# Patient Record
Sex: FEMALE | Race: WHITE | Employment: FULL TIME | URBAN - METROPOLITAN AREA
[De-identification: names, ages, dates, MRNs, and addresses within clinical notes are randomized per-mention and may not be internally consistent; named-entity substitution may affect disease eponyms.]

---

## 2021-07-12 ENCOUNTER — OFFICE VISIT (OUTPATIENT)
Dept: INTERNAL MEDICINE CLINIC | Age: 66
End: 2021-07-12
Payer: MEDICARE

## 2021-07-12 VITALS
WEIGHT: 117.2 LBS | HEIGHT: 63 IN | BODY MASS INDEX: 20.77 KG/M2 | DIASTOLIC BLOOD PRESSURE: 80 MMHG | RESPIRATION RATE: 14 BRPM | OXYGEN SATURATION: 97 % | TEMPERATURE: 98.2 F | SYSTOLIC BLOOD PRESSURE: 134 MMHG | HEART RATE: 67 BPM

## 2021-07-12 DIAGNOSIS — Z12.11 SCREENING FOR COLON CANCER: ICD-10-CM

## 2021-07-12 DIAGNOSIS — R01.1 SYSTOLIC MURMUR: ICD-10-CM

## 2021-07-12 DIAGNOSIS — Z12.31 ENCOUNTER FOR SCREENING MAMMOGRAM FOR MALIGNANT NEOPLASM OF BREAST: ICD-10-CM

## 2021-07-12 DIAGNOSIS — I83.93 VARICOSE VEINS OF BOTH LOWER EXTREMITIES, UNSPECIFIED WHETHER COMPLICATED: ICD-10-CM

## 2021-07-12 DIAGNOSIS — Z00.00 ROUTINE ADULT HEALTH MAINTENANCE: ICD-10-CM

## 2021-07-12 DIAGNOSIS — Z78.0 POST-MENOPAUSAL: ICD-10-CM

## 2021-07-12 DIAGNOSIS — I73.00 RAYNAUD'S DISEASE WITHOUT GANGRENE: Primary | ICD-10-CM

## 2021-07-12 PROCEDURE — 99214 OFFICE O/P EST MOD 30 MIN: CPT | Performed by: INTERNAL MEDICINE

## 2021-07-12 PROCEDURE — 1090F PRES/ABSN URINE INCON ASSESS: CPT | Performed by: INTERNAL MEDICINE

## 2021-07-12 PROCEDURE — G8400 PT W/DXA NO RESULTS DOC: HCPCS | Performed by: INTERNAL MEDICINE

## 2021-07-12 PROCEDURE — G8420 CALC BMI NORM PARAMETERS: HCPCS | Performed by: INTERNAL MEDICINE

## 2021-07-12 PROCEDURE — G8536 NO DOC ELDER MAL SCRN: HCPCS | Performed by: INTERNAL MEDICINE

## 2021-07-12 PROCEDURE — G8427 DOCREV CUR MEDS BY ELIG CLIN: HCPCS | Performed by: INTERNAL MEDICINE

## 2021-07-12 PROCEDURE — 3017F COLORECTAL CA SCREEN DOC REV: CPT | Performed by: INTERNAL MEDICINE

## 2021-07-12 PROCEDURE — G9899 SCRN MAM PERF RSLTS DOC: HCPCS | Performed by: INTERNAL MEDICINE

## 2021-07-12 PROCEDURE — 1101F PT FALLS ASSESS-DOCD LE1/YR: CPT | Performed by: INTERNAL MEDICINE

## 2021-07-12 PROCEDURE — G8510 SCR DEP NEG, NO PLAN REQD: HCPCS | Performed by: INTERNAL MEDICINE

## 2021-07-12 RX ORDER — VITAMIN E CAP 100 UNIT 100 UNIT
CAP ORAL DAILY
COMMUNITY

## 2021-07-12 RX ORDER — METAPROTERENOL SULFATE 20 MG
TABLET ORAL
COMMUNITY
End: 2022-10-25

## 2021-07-12 RX ORDER — ASCORBIC ACID 250 MG
TABLET ORAL
COMMUNITY

## 2021-07-12 RX ORDER — ACETAMINOPHEN 500 MG
TABLET ORAL DAILY
COMMUNITY
End: 2022-10-25

## 2021-07-12 RX ORDER — BISMUTH SUBSALICYLATE 262 MG
1 TABLET,CHEWABLE ORAL DAILY
COMMUNITY

## 2021-07-12 NOTE — ASSESSMENT & PLAN NOTE
Reports noting her fingers turn blue in the cold. Ongoing since teenage years. Recently, has noticed skin tightening in her face. No difficulty swallowing, joint pain or swelling, dry eyes, dry mouth, photosensitive rash. Evaluate for possible underlying autoimmune disorder given history of what sounds like Raynaud's and skin tightening.   Labs ordered, rheumatology referral provided

## 2021-07-12 NOTE — ASSESSMENT & PLAN NOTE
She is not that interested in cancer screening because she is not currently having symptoms. Advised that the goal of cancer screening is to prevent or catch cancer prior to symptoms. FIT testing mammogram ordered. DEXA scan ordered.

## 2021-07-12 NOTE — ASSESSMENT & PLAN NOTE
2-9/9, systolic. Known previously.  No chest pain, shortness of breath, lightheadedness, dizziness  Cont to monitor
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
## 2021-07-12 NOTE — ASSESSMENT & PLAN NOTE
Status post vein stripping in the past.  Interested in discussing possible interventions.   Denies any symptoms  Referral to vascular provided

## 2021-07-23 LAB — HEMOCCULT STL QL IA: NEGATIVE

## 2021-07-29 LAB
14.3.3 ETA, RHEUM. ARTHRITIS: <0.2 NG/ML
ALBUMIN SERPL-MCNC: 4.8 G/DL (ref 3.8–4.8)
ALBUMIN/GLOB SERPL: 1.9 {RATIO} (ref 1.2–2.2)
ALP SERPL-CCNC: 73 IU/L (ref 48–121)
ALT SERPL-CCNC: 15 IU/L (ref 0–32)
ANA SER QL: POSITIVE
ANTI-RNA POLYMERASE III (RDL): <20 UNITS
AST SERPL-CCNC: 21 IU/L (ref 0–40)
BASOPHILS # BLD AUTO: 0.1 X10E3/UL (ref 0–0.2)
BASOPHILS NFR BLD AUTO: 1 %
BILIRUB SERPL-MCNC: 0.5 MG/DL (ref 0–1.2)
BUN SERPL-MCNC: 12 MG/DL (ref 8–27)
BUN/CREAT SERPL: 17 (ref 12–28)
CALCIUM SERPL-MCNC: 9.8 MG/DL (ref 8.7–10.3)
CCP IGA+IGG SERPL IA-ACNC: <20 UNITS
CENTROMERE B AB SER-ACNC: <0.2 AI (ref 0–0.9)
CHLORIDE SERPL-SCNC: 104 MMOL/L (ref 96–106)
CHOLEST SERPL-MCNC: 226 MG/DL (ref 100–199)
CO2 SERPL-SCNC: 26 MMOL/L (ref 20–29)
CREAT SERPL-MCNC: 0.72 MG/DL (ref 0.57–1)
CRP SERPL-MCNC: <1 MG/L (ref 0–10)
ENA SCL70 AB SER QL: POSITIVE
EOSINOPHIL # BLD AUTO: 0 X10E3/UL (ref 0–0.4)
EOSINOPHIL NFR BLD AUTO: 1 %
ERYTHROCYTE [DISTWIDTH] IN BLOOD BY AUTOMATED COUNT: 12.5 % (ref 11.7–15.4)
ERYTHROCYTE [SEDIMENTATION RATE] IN BLOOD BY WESTERGREN METHOD: 3 MM/HR (ref 0–40)
EST. AVERAGE GLUCOSE BLD GHB EST-MCNC: 103 MG/DL
GLOBULIN SER CALC-MCNC: 2.5 G/DL (ref 1.5–4.5)
GLUCOSE SERPL-MCNC: 93 MG/DL (ref 65–99)
HBA1C MFR BLD: 5.2 % (ref 4.8–5.6)
HCT VFR BLD AUTO: 38.7 % (ref 34–46.6)
HDLC SERPL-MCNC: 98 MG/DL
HGB BLD-MCNC: 13.1 G/DL (ref 11.1–15.9)
IMM GRANULOCYTES # BLD AUTO: 0 X10E3/UL (ref 0–0.1)
IMM GRANULOCYTES NFR BLD AUTO: 0 %
IMP & REVIEW OF LAB RESULTS: NORMAL
LDLC SERPL CALC-MCNC: 117 MG/DL (ref 0–99)
LYMPHOCYTES # BLD AUTO: 1.6 X10E3/UL (ref 0.7–3.1)
LYMPHOCYTES NFR BLD AUTO: 32 %
MCH RBC QN AUTO: 30 PG (ref 26.6–33)
MCHC RBC AUTO-ENTMCNC: 33.9 G/DL (ref 31.5–35.7)
MCV RBC AUTO: 89 FL (ref 79–97)
MONOCYTES # BLD AUTO: 0.4 X10E3/UL (ref 0.1–0.9)
MONOCYTES NFR BLD AUTO: 7 %
NEUTROPHILS # BLD AUTO: 3 X10E3/UL (ref 1.4–7)
NEUTROPHILS NFR BLD AUTO: 59 %
PLATELET # BLD AUTO: 237 X10E3/UL (ref 150–450)
POTASSIUM SERPL-SCNC: 4.4 MMOL/L (ref 3.5–5.2)
PROT SERPL-MCNC: 7.3 G/DL (ref 6–8.5)
RBC # BLD AUTO: 4.37 X10E6/UL (ref 3.77–5.28)
RHEUMATOID FACT SERPL-ACNC: <14 UNITS/ML
SCL-70 AB: 31 UNITS
SODIUM SERPL-SCNC: 142 MMOL/L (ref 134–144)
TRIGL SERPL-MCNC: 63 MG/DL (ref 0–149)
VIT B12 SERPL-MCNC: 1293 PG/ML (ref 232–1245)
VLDLC SERPL CALC-MCNC: 11 MG/DL (ref 5–40)
WBC # BLD AUTO: 5.1 X10E3/UL (ref 3.4–10.8)

## 2021-08-02 NOTE — PROGRESS NOTES
[employer] message sent. CBC normal.  CMP normal.  . RA labs negative. A1c normal.  Centromere antibody negative. Anti-SCL 70 antibody positive with positive confirmatory test.  RNA Renetta negative. SOFIA positive. ESR normal.  B12 1293. CRP normal.    Positive anti SCL-70. Rec rheumatology referral to [facility] for possible systemic sclerosis as an explanation for her symptoms.     Monitor lipids

## 2021-08-03 ENCOUNTER — TELEPHONE (OUTPATIENT)
Dept: INTERNAL MEDICINE CLINIC | Age: 66
End: 2021-08-03

## 2021-08-03 NOTE — TELEPHONE ENCOUNTER
----- Message from Hever Morataya sent at 8/3/2021 12:35 PM EDT -----  Regarding: /telephone  Contact: [phone]  Patient return call    Caller's first and last name and relationship (if not the patient): N/A      Best contact number(s): [phone]       Whose call is being returned: PCP      Details to clarify the request: Lab results      Hever Morataya

## 2021-08-03 NOTE — TELEPHONE ENCOUNTER
I spoke with patient, she has questions regarding her results. I advise that PCP called her and left a message to call back to discuss test results. I spoke with pcp, she will talk to pt. Call was transferred.

## 2021-09-30 ENCOUNTER — HOSPITAL ENCOUNTER (OUTPATIENT)
Dept: MAMMOGRAPHY | Age: 66
Discharge: HOME | End: 2021-09-30
Attending: INTERNAL MEDICINE
Payer: MEDICARE

## 2021-09-30 DIAGNOSIS — Z78.0 POST-MENOPAUSAL: ICD-10-CM

## 2021-09-30 PROCEDURE — 77080 DXA BONE DENSITY AXIAL: CPT

## 2021-10-04 ENCOUNTER — TELEPHONE (OUTPATIENT)
Dept: INTERNAL MEDICINE CLINIC | Age: 66
End: 2021-10-04

## 2021-10-04 NOTE — TELEPHONE ENCOUNTER
----- Message from David Sung MD sent at 69/2/3414 12:36 PM EDT -----  Please call the pt - recommend appt to discuss DEXA scan results

## 2021-10-04 NOTE — PROGRESS NOTES
[employer] message sent.    =  Your bone density scan shows osteoporosis, or severe thinning of the bone. I recommend scheduling an appointment to discuss these results and treatment options going forward.

## 2021-10-11 ENCOUNTER — OFFICE VISIT (OUTPATIENT)
Dept: INTERNAL MEDICINE CLINIC | Age: 66
End: 2021-10-11
Payer: MEDICARE

## 2021-10-11 VITALS
RESPIRATION RATE: 14 BRPM | HEIGHT: 63 IN | WEIGHT: 114 LBS | HEART RATE: 69 BPM | TEMPERATURE: 97.2 F | BODY MASS INDEX: 20.2 KG/M2 | SYSTOLIC BLOOD PRESSURE: 132 MMHG | DIASTOLIC BLOOD PRESSURE: 80 MMHG | OXYGEN SATURATION: 97 %

## 2021-10-11 DIAGNOSIS — E55.9 VITAMIN D DEFICIENCY: ICD-10-CM

## 2021-10-11 DIAGNOSIS — M81.0 OSTEOPOROSIS WITHOUT CURRENT PATHOLOGICAL FRACTURE, UNSPECIFIED OSTEOPOROSIS TYPE: Primary | ICD-10-CM

## 2021-10-11 PROCEDURE — G8536 NO DOC ELDER MAL SCRN: HCPCS | Performed by: INTERNAL MEDICINE

## 2021-10-11 PROCEDURE — 3017F COLORECTAL CA SCREEN DOC REV: CPT | Performed by: INTERNAL MEDICINE

## 2021-10-11 PROCEDURE — 99214 OFFICE O/P EST MOD 30 MIN: CPT | Performed by: INTERNAL MEDICINE

## 2021-10-11 PROCEDURE — G8427 DOCREV CUR MEDS BY ELIG CLIN: HCPCS | Performed by: INTERNAL MEDICINE

## 2021-10-11 PROCEDURE — G8420 CALC BMI NORM PARAMETERS: HCPCS | Performed by: INTERNAL MEDICINE

## 2021-10-11 PROCEDURE — G8510 SCR DEP NEG, NO PLAN REQD: HCPCS | Performed by: INTERNAL MEDICINE

## 2021-10-11 PROCEDURE — 1090F PRES/ABSN URINE INCON ASSESS: CPT | Performed by: INTERNAL MEDICINE

## 2021-10-11 PROCEDURE — G9899 SCRN MAM PERF RSLTS DOC: HCPCS | Performed by: INTERNAL MEDICINE

## 2021-10-11 PROCEDURE — 1101F PT FALLS ASSESS-DOCD LE1/YR: CPT | Performed by: INTERNAL MEDICINE

## 2021-10-11 NOTE — PROGRESS NOTES
Note   Chief Complaint   DEXA results    Ehsan Ortega is a 73 y.o. female     1. Osteoporosis without current pathological fracture, unspecified osteoporosis type  Assessment & Plan:  Femoral Neck Left:  Bone mineral density (gm/cm2): 0.688 g/cm?  % of peak bone mass: 66%  % for age matched controls: 86%  T-score: -2.5   Z-score: -0.8      Femoral Neck Right:  Bone mineral density (gm/cm2): 0.693 g/cm?  % of peak bone mass: 67%  % for age matched controls:  87%  T-score: -2.5   Z-score: -0.7      Total Hip Left:  Bone mineral density (gm/cm2): 0.702 g/cm?  % of peak bone mass: 70%  % for age matched controls: 86%  T-score: -2.4   Z-score: -0.9      Total Hip Right:  Bone mineral density (gm/cm2): 0.703 g/cm?  % of peak bone mass: 70%  % for age matched controls:  86%  T-score: -2.4   Z-score: -0.9      Lumbar Spine: L1-4 or specify  Bone mineral density (gm/cm2): 0.761 g/cm?  % of peak bone mass: 64%  % for age matched controls: 81%  T-score: -3.5   Z-score: -1.5      33% Radius Left:  Bone mineral density (gm/cm2): 0.507 g/cm?  % of peak bone mass: 58%  % for age matched controls:  67%  T-score: -4.2   Z-score: -2.8      Discussed osteoporosis diagnosis. Given severity at lumbar spine, likely recommend endocrine referral.  Check vit D and other labs to rule out secondary causes of osteoporosis. Recommend ca-vit D supplementation pending labs and weight bearing exercises. Orders:  -     VITAMIN D, 25 HYDROXY; Future  -     PTH INTACT; Future  -     CALCIUM; Future  -     SPEP AND GHADA, SERUM; Future       Benefits, risks, possible drug interactions, and side effects of all new medications were reviewed with the patient. Pt verbalized understanding. Return to clinic:  As needed    An electronic signature was used to authenticate this note.   Swapna Saucedo MD  [facility]  10/11/2021    Future Appointments   Date Time Provider Nathaniel Quinn   11/24/2021  8:20 AM Lidia Villasenor MD [facility] BS AMB        Objective   Vitals:       Visit Vitals  /80 (BP 1 Location: Left upper arm, BP Patient Position: Sitting, BP Cuff Size: Adult)   Pulse 69   Temp 97.2 °F (36.2 °C) (Temporal)   Resp 14   Ht 5' 3\" (1.6 m)   Wt 114 lb (51.7 kg)   SpO2 97%   BMI 20.19 kg/m²        Physical Exam  Constitutional:       Appearance: Normal appearance. She is not ill-appearing. Cardiovascular:      Rate and Rhythm: Normal rate. Pulmonary:      Effort: No respiratory distress. Neurological:      Mental Status: She is alert. Current Outpatient Medications   Medication Sig    multivitamin (ONE A DAY) tablet Take 1 Tablet by mouth daily.  ascorbic acid, vitamin C, (VITAMIN C) 250 mg tablet Take  by mouth. otc    fish oil-omega-3 fatty acids 300-500 mg cap Take  by mouth. otc    cholecalciferol (VITAMIN D3) (2,000 UNITS /50 MCG) cap capsule Take  by mouth daily. otc    zinc sulfate (ZINC-15 PO) Take  by mouth. otc    vitamin e (E GEMS) 100 unit capsule Take  by mouth daily. otc    Wfqkttvf-Fujh-Stfmtu-Hyalur Ac 554-576-32-2 mg cap Take  by mouth. (Patient not taking: Reported on 10/11/2021)     No current facility-administered medications for this visit.

## 2021-10-11 NOTE — PATIENT INSTRUCTIONS
Osteoporosis: Care Instructions  Overview     Osteoporosis causes bones to become thin and weak. It is much more common in women than in men. Your chances of getting this disease depend on several things. These factors include the thickness of your bones (bone density), as well as health, diet, and physical activity. This disease may be very advanced before you know you have it. Sometimes the first sign is a broken bone in the hip, spine, or wrist. Or you may have sudden pain in your middle or lower back. Follow-up care is a key part of your treatment and safety. Be sure to make and go to all appointments, and call your doctor if you are having problems. It's also a good idea to know your test results and keep a list of the medicines you take. How can you care for yourself at home? · Your doctor may prescribe a bisphosphonate, such as risedronate (Actonel) or alendronate (Fosamax), for osteoporosis. If you are taking one of these medicines by mouth:  ? Take your medicine with a full glass of water when you first get up in the morning. ? Do not lie down, eat, drink a beverage, or take any other medicine for at least 30 minutes after taking the drug. This helps prevent stomach problems. ? Do not take your medicine late in the day if you forgot to take it in the morning. Skip it, and take the usual dose the next morning. ? If you have side effects, tell your doctor. Your doctor may prescribe another medicine. · Get enough calcium and vitamin D. The recommended daily allowances (RDAs) for adults younger than age 46 are 1,000 mg of calcium and 600 IU of vitamin D each day. Women ages 46 to 79 need 1,200 mg of calcium and 600 IU of vitamin D each day. Men ages 46 to 79 need 1,000 mg of calcium and 600 IU of vitamin D each day. Adults 71 and older need 1,200 mg of calcium and 800 IU of vitamin D each day. It's not clear if people who already have osteoporosis need more calcium and vitamin D than this.  Talk to your doctor about what's right for you.  ? Eat foods rich in calcium, like yogurt, cheese, milk, and dark green vegetables. This is a good way to get the calcium you need. You can get vitamin D from eggs, fatty fish, cereal, and milk. ? Ask your doctor if you need to take a calcium plus vitamin D supplement. You may be able to get enough calcium and vitamin D through your diet. Be careful with supplements. Adults ages 23 to 48 should not get more than 2,500 mg of calcium and 4,000 IU of vitamin D each day, whether it is from supplements and/or food. Adults ages 46 and older should not get more than 2,000 mg of calcium and 4,000 IU of vitamin D each day from supplements and/or food. · Limit alcohol to 2 drinks a day for men and 1 drink a day for women. Too much alcohol can cause health problems. · Do not smoke. Smoking puts you at a much higher risk for osteoporosis. If you need help quitting, talk to your doctor about stop-smoking programs and medicines. These can increase your chances of quitting for good. · Get regular bone-building exercise. Weight-bearing and resistance exercises keep bones healthy by working the muscles and bones against gravity. Start out at an exercise level that feels right for you. Add a little at a time until you can do the following:  ? Do 30 minutes of weight-bearing exercise on most days of the week. Walking, jogging, stair climbing, and dancing are good choices. ? Do resistance exercises with weights or elastic bands 2 to 3 days a week. · Reduce your risk of falls:  ? Wear supportive shoes with low heels and nonslip soles. ? Use a cane or walker, if you need it. Use shower chairs and bath benches. Put in handrails on stairways, around your shower or tub area, and near the toilet. ? Keep stairs, porches, and walkways well lit. Use night-lights. ? Remove throw rugs and other objects that are in the way. ? Avoid icy, wet, or slippery surfaces. ?  Keep a cordless phone and a flashlight with new batteries by your bed. When should you call for help? Watch closely for changes in your health, and be sure to contact your doctor if you have any problems. Where can you learn more? Go to http://www.gray.com/  Enter K100 in the search box to learn more about \"Osteoporosis: Care Instructions. \"  Current as of: December 7, 2020               Content Version: 13.0  © 2006-2021 [employer]. Care instructions adapted under license by [employer] (which disclaims liability or warranty for this information). If you have questions about a medical condition or this instruction, always ask your healthcare professional. [address] any warranty or liability for your use of this information.

## 2021-10-11 NOTE — ASSESSMENT & PLAN NOTE
Femoral Neck Left:  Bone mineral density (gm/cm2): 0.688 g/cm?  % of peak bone mass: 66%  % for age matched controls: 86%  T-score: -2.5   Z-score: -0.8      Femoral Neck Right:  Bone mineral density (gm/cm2): 0.693 g/cm?  % of peak bone mass: 67%  % for age matched controls:  87%  T-score: -2.5   Z-score: -0.7      Total Hip Left:  Bone mineral density (gm/cm2): 0.702 g/cm?  % of peak bone mass: 70%  % for age matched controls: 86%  T-score: -2.4   Z-score: -0.9      Total Hip Right:  Bone mineral density (gm/cm2): 0.703 g/cm?  % of peak bone mass: 70%  % for age matched controls:  86%  T-score: -2.4   Z-score: -0.9      Lumbar Spine: L1-4 or specify  Bone mineral density (gm/cm2): 0.761 g/cm?  % of peak bone mass: 64%  % for age matched controls: 81%  T-score: -3.5   Z-score: -1.5      33% Radius Left:  Bone mineral density (gm/cm2): 0.507 g/cm?  % of peak bone mass: 58%  % for age matched controls:  67%  T-score: -4.2   Z-score: -2.8      Discussed osteoporosis diagnosis. Given severity at lumbar spine, likely recommend endocrine referral.  Check vit D and other labs to rule out secondary causes of osteoporosis. Recommend ca-vit D supplementation pending labs and weight bearing exercises.

## 2021-10-12 LAB
25(OH)D3 SERPL-MCNC: 26.2 NG/ML (ref 30–100)
CALCIUM SERPL-MCNC: 9.3 MG/DL (ref 8.5–10.1)
PTH-INTACT SERPL-MCNC: 42.7 PG/ML (ref 18.4–88)

## 2021-10-14 LAB
ALBUMIN SERPL ELPH-MCNC: 4.2 G/DL (ref 2.9–4.4)
ALBUMIN/GLOB SERPL: 1.7 {RATIO} (ref 0.7–1.7)
ALPHA1 GLOB SERPL ELPH-MCNC: 0.2 G/DL (ref 0–0.4)
ALPHA2 GLOB SERPL ELPH-MCNC: 0.6 G/DL (ref 0.4–1)
B-GLOBULIN SERPL ELPH-MCNC: 0.8 G/DL (ref 0.7–1.3)
GAMMA GLOB SERPL ELPH-MCNC: 1 G/DL (ref 0.4–1.8)
GLOBULIN SER-MCNC: 2.6 G/DL (ref 2.2–3.9)
IGA SERPL-MCNC: 125 MG/DL (ref 87–352)
IGG SERPL-MCNC: 983 MG/DL (ref 586–1602)
IGM SERPL-MCNC: 119 MG/DL (ref 26–217)
INTERPRETATION SERPL IEP-IMP: NORMAL
M PROTEIN SERPL ELPH-MCNC: NORMAL G/DL
PROT SERPL-MCNC: 6.8 G/DL (ref 6–8.5)

## 2021-10-18 RX ORDER — CALCIUM CARBONATE 500(1250)
1 TABLET ORAL 2 TIMES DAILY WITH MEALS
Qty: 180 TABLET | Refills: 3 | Status: SHIPPED | OUTPATIENT
Start: 2021-10-18 | End: 2021-10-21 | Stop reason: SDUPTHER

## 2021-10-18 RX ORDER — ERGOCALCIFEROL 1.25 MG/1
50000 CAPSULE ORAL
Qty: 12 CAPSULE | Refills: 0 | Status: SHIPPED | OUTPATIENT
Start: 2021-10-18 | End: 2021-10-21 | Stop reason: SDUPTHER

## 2021-10-18 NOTE — PROGRESS NOTES
[employer] message sent.   SPEP negative, PTH/Ca normal, vit D 26.2  Rec ergocal x 3 months and calcium supplement

## 2021-10-21 ENCOUNTER — TELEPHONE (OUTPATIENT)
Dept: INTERNAL MEDICINE CLINIC | Age: 66
End: 2021-10-21

## 2021-10-21 DIAGNOSIS — E55.9 VITAMIN D DEFICIENCY: ICD-10-CM

## 2021-10-21 DIAGNOSIS — M81.0 OSTEOPOROSIS WITHOUT CURRENT PATHOLOGICAL FRACTURE, UNSPECIFIED OSTEOPOROSIS TYPE: ICD-10-CM

## 2021-10-21 RX ORDER — CALCIUM CARBONATE 500(1250)
1 TABLET ORAL 2 TIMES DAILY WITH MEALS
Qty: 180 TABLET | Refills: 3 | Status: SHIPPED | OUTPATIENT
Start: 2021-10-21

## 2021-10-21 RX ORDER — ERGOCALCIFEROL 1.25 MG/1
50000 CAPSULE ORAL
Qty: 12 CAPSULE | Refills: 0 | Status: SHIPPED | OUTPATIENT
Start: 2021-10-21 | End: 2022-03-10

## 2021-10-21 NOTE — TELEPHONE ENCOUNTER
Medications resent to provider and pharmacy corrected to [facility] on [address]. I advised patient to give provider to the end of the day , and also set up with the pharmacy where she will get notifications that medications are ready for  patient was thankful.

## 2021-10-21 NOTE — TELEPHONE ENCOUNTER
----- Message from Joeharvey Carlotta sent at 10/20/2021  5:34 PM EDT -----  Subject: Message to Provider    QUESTIONS  Information for Provider? Pt called to confirm that [facility] is [facility] to   [facility]. Please contact pt.  ---------------------------------------------------------------------------  --------------  CALL BACK INFO  What is the best way for the office to contact you? OK to leave message on   voicemail  Preferred Call Back Phone Number? [phone]  ---------------------------------------------------------------------------  --------------  SCRIPT ANSWERS  Relationship to Patient?  Self

## 2021-11-24 ENCOUNTER — OFFICE VISIT (OUTPATIENT)
Dept: RHEUMATOLOGY | Age: 66
End: 2021-11-24
Payer: MEDICARE

## 2021-11-24 VITALS
HEIGHT: 63 IN | RESPIRATION RATE: 18 BRPM | WEIGHT: 115 LBS | SYSTOLIC BLOOD PRESSURE: 168 MMHG | BODY MASS INDEX: 20.38 KG/M2 | HEART RATE: 63 BPM | OXYGEN SATURATION: 99 % | TEMPERATURE: 98.1 F | DIASTOLIC BLOOD PRESSURE: 81 MMHG

## 2021-11-24 DIAGNOSIS — R06.09 DYSPNEA ON EXERTION: ICD-10-CM

## 2021-11-24 DIAGNOSIS — M34.9 SYSTEMIC SCLEROSIS WITH LIMITED CUTANEOUS INVOLVEMENT (HCC): Primary | ICD-10-CM

## 2021-11-24 DIAGNOSIS — Z11.59 SCREENING FOR VIRAL DISEASE: ICD-10-CM

## 2021-11-24 PROCEDURE — 3017F COLORECTAL CA SCREEN DOC REV: CPT | Performed by: INTERNAL MEDICINE

## 2021-11-24 PROCEDURE — G8420 CALC BMI NORM PARAMETERS: HCPCS | Performed by: INTERNAL MEDICINE

## 2021-11-24 PROCEDURE — 1090F PRES/ABSN URINE INCON ASSESS: CPT | Performed by: INTERNAL MEDICINE

## 2021-11-24 PROCEDURE — G8536 NO DOC ELDER MAL SCRN: HCPCS | Performed by: INTERNAL MEDICINE

## 2021-11-24 PROCEDURE — G8427 DOCREV CUR MEDS BY ELIG CLIN: HCPCS | Performed by: INTERNAL MEDICINE

## 2021-11-24 PROCEDURE — 99205 OFFICE O/P NEW HI 60 MIN: CPT | Performed by: INTERNAL MEDICINE

## 2021-11-24 PROCEDURE — G9899 SCRN MAM PERF RSLTS DOC: HCPCS | Performed by: INTERNAL MEDICINE

## 2021-11-24 PROCEDURE — 1101F PT FALLS ASSESS-DOCD LE1/YR: CPT | Performed by: INTERNAL MEDICINE

## 2021-11-24 PROCEDURE — G8432 DEP SCR NOT DOC, RNG: HCPCS | Performed by: INTERNAL MEDICINE

## 2021-11-24 NOTE — PATIENT INSTRUCTIONS
1. 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 For now, we don't need to start you on medications such as immunosuppression, but we'll be monitoring skin changes and organ function carefully to ensure we can catch problems early. 2. Labs at your earliest convenience. 3. Pulmonary function tests (PFTs). You should be able to get these done at [facility], phone [phone]. If they direct you to call a Pulmonary clinic, then let us know and we can give you a separate referral instead to [facility] or a convenient pulmonary group. 4. Echocardiogram. Similar to the above, call the above number to schedule. 5. Schedule a 1-2 month virtual followup to go over results. We will most likely follow every 6 months from there. Measure your blood pressure at least 3x/week and let me and Dr. Sandoval Gregg know if the systolics remain above 182. Let me know of any skin changes, breathing problems, headaches, or digestive issues in the meantime.

## 2021-11-24 NOTE — LETTER
11/24/2021    Patient: Emma Ramirez   YOB: 1955   Date of Visit: 11/24/2021     Margo Quinteros, [address] [facility]  [address]  [address] [facility]  [address] [facility] [address]    Dear Margo Quinteros MD,      Thank you for referring Ms. Raman Gonsalez to [address] [place] [address] for evaluation. My notes for this consultation are attached. If you have questions, please do not hesitate to call me. I look forward to following your patient along with you.       Sincerely,    Liz Ryan MD  Cell: [phone]

## 2021-11-24 NOTE — PROGRESS NOTES
REASON FOR VISIT:    is a 73 y.o. female with history of osteoporosis who is being referred to [address] [place] [address] Rheumatology at the request of Dr. Helen Chandra, regarding a diagnosis of possible scleroderma. HISTORY OF PRESENT ILLNESS    Had established with a new PCP after her 66th birthday, noted at that time that her face seemed to be tightening, and followup labs demonstrated +Scl70 antibodies. 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 Feels she has learned to work around this with layering and cold avoidance. Fingers turn blue and won't turn back until she gets to a dry heat source. No history of digital ulcers. Thinks it involves all of her fingers, toes feel cold and numb but don't change colors the same way. Temperature is the primary trigger, ie grocery shopping--does OK with stress and exercise. Feels her face is appreciably tighter over the last 5 years. Saw a Dermatologist a few years ago but wasn't diagnosed with anything pathological. Feels her face is tight \"like there's something eating,\" feels symptoms wax and wane over the day. Feels an occasional diazepam or beer will help her face relax. Fingers have also felt bit more stiff over the last 3 years, bit more effort to open cans. No darkening or thickened patches. For exercise, averages 15K steps/day particularly with her new puppy. Exercises with videos. Denies progressive dyspnea on exertion, denies cough. Denies postprandial bloating, diarrhea, or constipation. No globus sensation. Has been told she has a heart murmur, nothing pathologic to her knowledge. Historically BP runs low, though over the last 3 years she has been running 668'M systolic. Today was 743 systolic, suspects her multiple stressors are contributing. She has had more low back pain she attributes to increased activity with her new puppy. No joint swelling.       REVIEW OF SYSTEMS  A comprehensive review of systems was negative except for that written in the HPI. A 10-point review of systems is per the new patient questionnaire, which has been reviewed extensively and scanned into the electronic medical record for future reference. Review of systems is as above and is otherwise negative. ALLERGIES  Patient has no known allergies. MEDICATIONS  Current Outpatient Medications   Medication Sig    calcium carbonate (Calcium 500) 500 mg calcium (1,250 mg) tablet Take 1 Tablet by mouth two (2) times daily (with meals). Indications: osteoporosis    ergocalciferol (ERGOCALCIFEROL) 1,250 mcg (50,000 unit) capsule Take 1 Capsule by mouth every seven (7) days. Indications: low vitamin D levels    multivitamin (ONE A DAY) tablet Take 1 Tablet by mouth daily.  ascorbic acid, vitamin C, (VITAMIN C) 250 mg tablet Take  by mouth. otc    fish oil-omega-3 fatty acids 300-500 mg cap Take  by mouth. otc    zinc sulfate (ZINC-15 PO) Take  by mouth. otc    vitamin e (E GEMS) 100 unit capsule Take  by mouth daily. otc    cholecalciferol (VITAMIN D3) (2,000 UNITS /50 MCG) cap capsule Take  by mouth daily. otc (Patient not taking: Reported on 2021)    Jucrwyqe-Yptk-Iyjtcj-Hyalur Ac 714-259-26-2 mg cap Take  by mouth. (Patient not taking: Reported on 10/11/2021)     No current facility-administered medications for this visit. PAST MEDICAL HISTORY  History reviewed. No pertinent past medical history. 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 FAMILY HISTORY  family history includes Hypertension in her father; Other in her mother. Denies family history of SLE, rheumatoid arthritis, or scleroderma. SOCIAL HISTORY  She  reports that she has never smoked. She has never used smokeless tobacco. She reports current alcohol use. She reports that she does not use drugs. Social History     Social History Narrative    Not on file   Has 3 children, 4 grandchildren. Youngest son is 32yo.   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 No flowsheet data found. General:  The patient is well developed, well nourished, alert, and in no apparent distress. Eyes: Sclera are anicteric. No conjunctival injection. HEENT: Decreased oral aperture and facial tightening c/w scleroderma facies. Oropharynx is clear. No oral ulcers. Adequate salivary pooling. No cervical or supraclavicular lymphadenopathy. Lungs:  Clear to auscultation bilaterally, without crackles, wheeze, or stridor. Normal respiratory effort. Cor:  Regular rate and rhythm. 2/6 HSM at apex. Abdomen: Soft, non-tender, without hepatomegaly or masses. Extremities: No calf tenderness or edema. +varicose veins. Warm and well perfused. Skin: Mildly abnormal nailfold capillaries with thinning and dilated/bushy forms, no hemorrhage or edema. Very mild sclerodactyly distal to PIPs. No digital pits or calcinosis. Overal mRSS just 1 for facial changes. No telangiectasias. Neuro: Nonfocal, normal gait, no foot or wrist drop  Musculoskeletal:    A comprehensive musculoskeletal exam was performed for all joints of each upper and lower extremity and assessed for swelling, tenderness and range of motion. Results are documented as below:  Early [place] nodes. No evidence of synovitis in the small joints of the hands, wrists, shoulders, elbows, hips, knees or ankles. Labs:  10/11/21: SPEP and GHADA WNL; Vit D 26.2, Ca 9.3;   21: WBC 5.1, Hgb 13.1, Plt 237; ESR 3, Cr 0.72, LFTs WNL;  Vit B12 1293, HDL 98, ; A1c 5.2; RDL Scl70+, [facility] direct SOFIA+ (no reflex)      Imagin21 DXA:  Femoral Neck Left:  Bone mineral density (gm/cm2): 0.688 g/cm?  % of peak bone mass: 66%  % for age matched controls: 86%  T-score: -2.5   Femoral Neck Right:  Bone mineral density (gm/cm2): 0.693 g/cm?  % of peak bone mass: 67%  % for age matched controls:  87%  T-score: -2.5   Total Hip Left:  Bone mineral density (gm/cm2): 0.702 g/cm?  % of peak bone mass: 70%  % for age matched controls: 86%  T-score: -2.4   Total Hip Right:  Bone mineral density (gm/cm2): 0.703 g/cm?  % of peak bone mass: 70%  % for age matched controls:  86%  T-score: -2.4   Lumbar Spine: L1-4 or specify  Bone mineral density (gm/cm2): 0.761 g/cm?  % of peak bone mass: 64%  % for age matched controls: 81%  T-score: -3.5   33% Radius Left:  Bone mineral density (gm/cm2): 0.507 g/cm?  % of peak bone mass: 58%  % for age matched controls:  67%  T-score: -4.2      IMPRESSION     This patient is osteoporotic using the Guido Ayo  Ms. Villagomez is a 73 y.o. 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 She has no clinical e/o interstitial lung disease or pulmonary hypertension, but checking baseline PFTs for longitudinal monitoring as she is at relatively high risk for ILD from her Scl70 antibody positivity. She is mildly hypertensive today, but has been so since at least June with normal kidney function and this is presumably related to her family stressors. Updating renal clearance, checking LDH and haptoglobin, with a low suspicion for aggressive ACE-I initiation with any results suggestive of renal crisis. She will continue to monitor her BP at home. With considerable time dedicated to the above, we did not broach the topic of managing her osteoporosis; happy to defer to Dr. Dioni Whipple on this, but we can also discuss in more detail next visit.  Would favor treatment with Evenity for rapid anabolic effects on bone density given her high risk of vertebral fracture, after which could maintain with Prolia. She has indicated some hesitation to pursue aggressive treatment however, and may prefer a more conservative approach. 1. 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 Screening for viral disease  - HEP B SURFACE AG; Future  - HEPATITIS BE AG; Future  - HEPATITIS B CORE AB, IGM; Future  - HEPATITIS B CORE AB, TOTAL; Future  - HEPATITIS BE AB; Future  - HEP B SURFACE AB; Future  - HCV AB W/RFLX TO VITO; Future    3. Dyspnea on exertion  - PFT COMPLETE; Future    Patient Instructions   1. 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 For now, we don't need to start you on medications such as immunosuppression, but we'll be monitoring skin changes and organ function carefully to ensure we can catch problems early. 2. Labs at your earliest convenience. 3. Pulmonary function tests (PFTs). You should be able to get these done at [facility], phone [phone]. If they direct you to call a Pulmonary clinic, then let us know and we can give you a separate referral instead to [facility] or a convenient pulmonary group. 4. Echocardiogram. Similar to the above, call the above number to schedule. 5. Schedule a 1-2 month virtual followup to go over results. We will most likely follow every 6 months from there.  Measure your blood pressure at least 3x/week and let me and  Marthaiciano know if the systolics remain above 554. Let me know of any skin changes, breathing problems, headaches, or digestive issues in the meantime. Orders Placed This Encounter    QUANTIFERON-TB GOLD PLUS    C REACTIVE PROTEIN, QT    CBC WITH AUTOMATED DIFF    METABOLIC PANEL, COMPREHENSIVE    SED RATE (ESR)    HEP B SURFACE AG    HEPATITIS BE AG    HEPATITIS B CORE AB, IGM    HEPATITIS B CORE AB, TOTAL    HEPATITIS BE AB    HEP B SURFACE AB    HCV AB W/RFLX TO VITO    LD    HAPTOGLOBIN    PFT COMPLETE       Medications: I am having Burnie Began. [facility] maintain her multivitamin, ascorbic acid (vitamin C), fish oil-omega-3 fatty acids, cholecalciferol, zinc sulfate (ZINC-15 PO), vitamin e, Rnjkqoiz-Smab-Lujfzx-Hyalur Ac, calcium carbonate, and ergocalciferol. Follow up: Return in about 2 months (around 1/24/2022).     Face to face time: 55 minutes  Note preparation and records review day of service: 25 minutes  Total provider time day of service: 80 minutes    Ildefonso Jolly MD    Adult Rheumatology   [facility] [address]   Phone [phone]  Fax [phone]

## 2021-12-27 LAB
ALBUMIN SERPL-MCNC: 4.7 G/DL (ref 3.8–4.8)
ALBUMIN/GLOB SERPL: 1.7 {RATIO} (ref 1.2–2.2)
ALP SERPL-CCNC: 77 IU/L (ref 44–121)
ALT SERPL-CCNC: 16 IU/L (ref 0–32)
AST SERPL-CCNC: 19 IU/L (ref 0–40)
BASOPHILS # BLD AUTO: 0 X10E3/UL (ref 0–0.2)
BASOPHILS NFR BLD AUTO: 1 %
BILIRUB SERPL-MCNC: 0.5 MG/DL (ref 0–1.2)
BUN SERPL-MCNC: 15 MG/DL (ref 8–27)
BUN/CREAT SERPL: 21 (ref 12–28)
CALCIUM SERPL-MCNC: 9.8 MG/DL (ref 8.7–10.3)
CHLORIDE SERPL-SCNC: 100 MMOL/L (ref 96–106)
CO2 SERPL-SCNC: 25 MMOL/L (ref 20–29)
CREAT SERPL-MCNC: 0.71 MG/DL (ref 0.57–1)
CRP SERPL-MCNC: <1 MG/L (ref 0–10)
EOSINOPHIL # BLD AUTO: 0.1 X10E3/UL (ref 0–0.4)
EOSINOPHIL NFR BLD AUTO: 2 %
ERYTHROCYTE [DISTWIDTH] IN BLOOD BY AUTOMATED COUNT: 12.2 % (ref 11.7–15.4)
ERYTHROCYTE [SEDIMENTATION RATE] IN BLOOD BY WESTERGREN METHOD: 11 MM/HR (ref 0–40)
GAMMA INTERFERON BACKGROUND BLD IA-ACNC: 0.04 IU/ML
GLOBULIN SER CALC-MCNC: 2.7 G/DL (ref 1.5–4.5)
GLUCOSE SERPL-MCNC: 103 MG/DL (ref 65–99)
HAPTOGLOB SERPL-MCNC: 99 MG/DL (ref 37–355)
HBV CORE AB SERPL QL IA: NEGATIVE
HBV CORE IGM SERPL QL IA: NEGATIVE
HBV E AB SERPL QL IA: NEGATIVE
HBV E AG SERPL QL IA: NEGATIVE
HBV SURFACE AB SER QL: NON REACTIVE
HBV SURFACE AG SERPL QL IA: NEGATIVE
HCT VFR BLD AUTO: 40 % (ref 34–46.6)
HCV AB S/CO SERPL IA: <0.1 S/CO RATIO (ref 0–0.9)
HCV AB SERPL QL IA: NORMAL
HGB BLD-MCNC: 13.4 G/DL (ref 11.1–15.9)
IMM GRANULOCYTES # BLD AUTO: 0 X10E3/UL (ref 0–0.1)
IMM GRANULOCYTES NFR BLD AUTO: 0 %
LDH SERPL-CCNC: 187 IU/L (ref 119–226)
LYMPHOCYTES # BLD AUTO: 1.5 X10E3/UL (ref 0.7–3.1)
LYMPHOCYTES NFR BLD AUTO: 27 %
M TB IFN-G BLD-IMP: NEGATIVE
M TB IFN-G CD4+ BCKGRND COR BLD-ACNC: 0.04 IU/ML
MCH RBC QN AUTO: 29.7 PG (ref 26.6–33)
MCHC RBC AUTO-ENTMCNC: 33.5 G/DL (ref 31.5–35.7)
MCV RBC AUTO: 89 FL (ref 79–97)
MITOGEN IGNF BLD-ACNC: >10 IU/ML
MONOCYTES # BLD AUTO: 0.4 X10E3/UL (ref 0.1–0.9)
MONOCYTES NFR BLD AUTO: 8 %
NEUTROPHILS # BLD AUTO: 3.4 X10E3/UL (ref 1.4–7)
NEUTROPHILS NFR BLD AUTO: 62 %
PLATELET # BLD AUTO: 277 X10E3/UL (ref 150–450)
POTASSIUM SERPL-SCNC: 4.7 MMOL/L (ref 3.5–5.2)
PROT SERPL-MCNC: 7.4 G/DL (ref 6–8.5)
QUANTIFERON INCUBATION, QF1T: NORMAL
QUANTIFERON TB2 AG: 0.04 IU/ML
RBC # BLD AUTO: 4.51 X10E6/UL (ref 3.77–5.28)
SERVICE CMNT-IMP: NORMAL
SODIUM SERPL-SCNC: 138 MMOL/L (ref 134–144)
WBC # BLD AUTO: 5.5 X10E3/UL (ref 3.4–10.8)

## 2022-02-09 ENCOUNTER — HOSPITAL ENCOUNTER (OUTPATIENT)
Dept: PULMONOLOGY | Age: 67
Discharge: HOME | End: 2022-02-09
Attending: INTERNAL MEDICINE
Payer: MEDICARE

## 2022-02-09 ENCOUNTER — HOSPITAL ENCOUNTER (OUTPATIENT)
Dept: NON INVASIVE DIAGNOSTICS | Age: 67
Discharge: HOME | End: 2022-02-09
Attending: INTERNAL MEDICINE
Payer: MEDICARE

## 2022-02-09 VITALS — OXYGEN SATURATION: 99 %

## 2022-02-09 VITALS
SYSTOLIC BLOOD PRESSURE: 153 MMHG | DIASTOLIC BLOOD PRESSURE: 94 MMHG | WEIGHT: 115 LBS | HEIGHT: 63 IN | BODY MASS INDEX: 20.38 KG/M2

## 2022-02-09 DIAGNOSIS — M34.9 SYSTEMIC SCLEROSIS WITH LIMITED CUTANEOUS INVOLVEMENT (HCC): ICD-10-CM

## 2022-02-09 DIAGNOSIS — R06.09 DYSPNEA ON EXERTION: ICD-10-CM

## 2022-02-09 LAB
ECHO AO ASC DIAM: 2.7 CM
ECHO AO ASCENDING AORTA INDEX: 1.76 CM/M2
ECHO AV AREA PEAK VELOCITY: 3.3 CM2
ECHO AV AREA PEAK VELOCITY: 3.3 CM2
ECHO AV AREA VTI: 3 CM2
ECHO AV AREA VTI: 3 CM2
ECHO AV MEAN GRADIENT: 4 MMHG
ECHO AV MEAN VELOCITY: 1 M/S
ECHO AV PEAK GRADIENT: 8 MMHG
ECHO AV PEAK VELOCITY: 1.4 M/S
ECHO AV VELOCITY RATIO: 1.07
ECHO AV VTI: 34.9 CM
ECHO LA DIAMETER INDEX: 2.09 CM/M2
ECHO LA DIAMETER: 3.2 CM
ECHO LA VOL 2C: 30 ML (ref 22–52)
ECHO LA VOL 4C: 25 ML (ref 22–52)
ECHO LA VOL BP: 28 ML (ref 22–52)
ECHO LA VOL BP: 28 ML (ref 22–52)
ECHO LA VOLUME AREA LENGTH: 30 ML
ECHO LA VOLUME INDEX A2C: 20 ML/M2 (ref 16–34)
ECHO LA VOLUME INDEX A4C: 16 ML/M2 (ref 16–34)
ECHO LA VOLUME INDEX AREA LENGTH: 20 ML/M2 (ref 16–34)
ECHO LV E' LATERAL VELOCITY: 11 CM/S
ECHO LV E' SEPTAL VELOCITY: 9 CM/S
ECHO LV FRACTIONAL SHORTENING: 31 % (ref 28–44)
ECHO LV INTERNAL DIMENSION DIASTOLE INDEX: 2.35 CM/M2
ECHO LV INTERNAL DIMENSION DIASTOLIC: 3.6 CM (ref 3.9–5.3)
ECHO LV INTERNAL DIMENSION SYSTOLIC INDEX: 1.63 CM/M2
ECHO LV INTERNAL DIMENSION SYSTOLIC: 2.5 CM
ECHO LV IVSD: 0.9 CM (ref 0.6–0.9)
ECHO LV MASS 2D: 92.8 G (ref 67–162)
ECHO LV MASS INDEX 2D: 60.6 G/M2 (ref 43–95)
ECHO LV POSTERIOR WALL DIASTOLIC: 0.9 CM (ref 0.6–0.9)
ECHO LV RELATIVE WALL THICKNESS RATIO: 0.5
ECHO LVOT AREA: 3.1 CM2
ECHO LVOT AV VTI INDEX: 0.97
ECHO LVOT DIAM: 2 CM
ECHO LVOT MEAN GRADIENT: 5 MMHG
ECHO LVOT PEAK GRADIENT: 9 MMHG
ECHO LVOT PEAK VELOCITY: 1.5 M/S
ECHO LVOT STROKE VOLUME INDEX: 69.8 ML/M2
ECHO LVOT SV: 106.8 ML
ECHO LVOT VTI: 34 CM
ECHO MV A VELOCITY: 0.69 M/S
ECHO MV E DECELERATION TIME (DT): 345.5 MS
ECHO MV E VELOCITY: 0.54 M/S
ECHO MV E/A RATIO: 0.78
ECHO MV E/E' LATERAL: 4.91
ECHO MV E/E' RATIO (AVERAGED): 5.45
ECHO MV E/E' SEPTAL: 6
ECHO PV MAX VELOCITY: 1.3 M/S
ECHO PV PEAK GRADIENT: 7 MMHG
ECHO RV INTERNAL DIMENSION: 3.7 CM
ECHO RV TAPSE: 2.3 CM (ref 1.5–2)
ECHO RVOT PEAK GRADIENT: 5 MMHG
ECHO RVOT PEAK VELOCITY: 1.1 M/S
ECHO TV REGURGITANT MAX VELOCITY: 2.36 M/S
ECHO TV REGURGITANT PEAK GRADIENT: 22 MMHG

## 2022-02-09 PROCEDURE — 94726 PLETHYSMOGRAPHY LUNG VOLUMES: CPT

## 2022-02-09 PROCEDURE — 94729 DIFFUSING CAPACITY: CPT

## 2022-02-09 PROCEDURE — 93306 TTE W/DOPPLER COMPLETE: CPT

## 2022-02-09 PROCEDURE — 94760 N-INVAS EAR/PLS OXIMETRY 1: CPT

## 2022-02-09 PROCEDURE — 93306 TTE W/DOPPLER COMPLETE: CPT | Performed by: STUDENT IN AN ORGANIZED HEALTH CARE EDUCATION/TRAINING PROGRAM

## 2022-02-09 PROCEDURE — 94375 RESPIRATORY FLOW VOLUME LOOP: CPT

## 2022-02-12 NOTE — PROCEDURES
Johnathan Vazquez [facility]  PULMONARY FUNCTION TEST    Name:  Trinity Goldsmith  MR#:  [id number]  :  1955  ACCOUNT #:  [de-identified]  DATE OF SERVICE:      Spirometry reveals an FEV1-to-FVC ratio at 67% predicted, which is consistent with an obstructive defect. FVC is within normal limits at 3.61 L and 133% predicted and FEV1 is 2.4 L and a 123% predicted making this a mild obstructive process. There is evidence of hyperinflation with a total lung capacity of 157% predicted and air trapping with a residual volume of 195% predicted. Diffusion capacity is normal at 87% predicted.       Amanuel Hirsch MD      [id number]  D:  2022 16:11  T:  2022 23:31  JOB #:  [id number]

## 2022-03-01 ENCOUNTER — TELEPHONE (OUTPATIENT)
Dept: RHEUMATOLOGY | Age: 67
End: 2022-03-01

## 2022-03-01 NOTE — TELEPHONE ENCOUNTER
Pt called to schedule follow up. Informed pt she's schedule for her 6mo follow up, provided date and time.

## 2022-03-09 ENCOUNTER — OFFICE VISIT (OUTPATIENT)
Dept: INTERNAL MEDICINE CLINIC | Age: 67
End: 2022-03-09
Payer: MEDICARE

## 2022-03-09 VITALS
TEMPERATURE: 97.9 F | SYSTOLIC BLOOD PRESSURE: 145 MMHG | OXYGEN SATURATION: 98 % | BODY MASS INDEX: 20.73 KG/M2 | WEIGHT: 117 LBS | HEIGHT: 63 IN | RESPIRATION RATE: 14 BRPM | DIASTOLIC BLOOD PRESSURE: 81 MMHG | HEART RATE: 67 BPM

## 2022-03-09 DIAGNOSIS — I10 PRIMARY HYPERTENSION: ICD-10-CM

## 2022-03-09 DIAGNOSIS — R01.1 SYSTOLIC MURMUR: ICD-10-CM

## 2022-03-09 DIAGNOSIS — M81.0 OSTEOPOROSIS WITHOUT CURRENT PATHOLOGICAL FRACTURE, UNSPECIFIED OSTEOPOROSIS TYPE: Primary | ICD-10-CM

## 2022-03-09 DIAGNOSIS — M34.9 SYSTEMIC SCLEROSIS WITH LIMITED CUTANEOUS INVOLVEMENT (HCC): ICD-10-CM

## 2022-03-09 PROCEDURE — G8420 CALC BMI NORM PARAMETERS: HCPCS | Performed by: INTERNAL MEDICINE

## 2022-03-09 PROCEDURE — G8754 DIAS BP LESS 90: HCPCS | Performed by: INTERNAL MEDICINE

## 2022-03-09 PROCEDURE — 1090F PRES/ABSN URINE INCON ASSESS: CPT | Performed by: INTERNAL MEDICINE

## 2022-03-09 PROCEDURE — G8536 NO DOC ELDER MAL SCRN: HCPCS | Performed by: INTERNAL MEDICINE

## 2022-03-09 PROCEDURE — G9899 SCRN MAM PERF RSLTS DOC: HCPCS | Performed by: INTERNAL MEDICINE

## 2022-03-09 PROCEDURE — G8427 DOCREV CUR MEDS BY ELIG CLIN: HCPCS | Performed by: INTERNAL MEDICINE

## 2022-03-09 PROCEDURE — 1101F PT FALLS ASSESS-DOCD LE1/YR: CPT | Performed by: INTERNAL MEDICINE

## 2022-03-09 PROCEDURE — G8510 SCR DEP NEG, NO PLAN REQD: HCPCS | Performed by: INTERNAL MEDICINE

## 2022-03-09 PROCEDURE — G8753 SYS BP > OR = 140: HCPCS | Performed by: INTERNAL MEDICINE

## 2022-03-09 PROCEDURE — 3017F COLORECTAL CA SCREEN DOC REV: CPT | Performed by: INTERNAL MEDICINE

## 2022-03-09 PROCEDURE — 99215 OFFICE O/P EST HI 40 MIN: CPT | Performed by: INTERNAL MEDICINE

## 2022-03-09 RX ORDER — ALENDRONATE SODIUM 70 MG/1
70 TABLET ORAL
Qty: 12 TABLET | Refills: 3 | Status: SHIPPED | OUTPATIENT
Start: 2022-03-09

## 2022-03-09 RX ORDER — AMLODIPINE BESYLATE 5 MG/1
5 TABLET ORAL DAILY
Qty: 30 TABLET | Refills: 1 | Status: SHIPPED | OUTPATIENT
Start: 2022-03-09 | End: 2022-05-05

## 2022-03-09 NOTE — ASSESSMENT & PLAN NOTE
Home BP's around the same reading here  Feels like she can tell when her BP is high   occl palpitations  Has also developed a cough - she feels this is associated with high BP  Continues to be elevated. rec starting amlodipine. Avoid diuretics to prevent stimulation of RAAS.    Already has a cough so she wants to avoid ACEi right now

## 2022-03-09 NOTE — PROGRESS NOTES
Note   Chief Complaint   BP, osteoporosis    Kyle Cote is a 73 y.o. female     1. Osteoporosis without current pathological fracture, unspecified osteoporosis type  Assessment & Plan:  Secondary osteoporosis workup negative   Finished ergocal x 3 months  Calcium BID was constipating so went to daily  Now taking OTC vitamin D   Working on weight bearing exercises   Severe osteoporosis noted on DEXA. rec treatment. Discussed evenity/prolia vs bisphosphonates. She is concerned about side effects. Discussed risks/benefits of both. Decided to go with fosamax (shorter half life, possibility of stopping at 5 years, she fels it's less aggressive than evenity/prolia). Ok to start 2 weeks after starting amlodipine if no SE with amlodipine and if vit D is normal  Orders:  -     VITAMIN D, 25 HYDROXY; Future  -     alendronate (FOSAMAX) 70 mg tablet; Take 1 Tablet by mouth every seven (7) days. Indications: osteoporosis, Normal, Disp-12 Tablet, R-3  2. Primary hypertension  Assessment & Plan:  Home BP's around the same reading here  Feels like she can tell when her BP is high   occl palpitations  Has also developed a cough - she feels this is associated with high BP  Continues to be elevated. rec starting amlodipine. Avoid diuretics to prevent stimulation of RAAS. Already has a cough so she wants to avoid ACEi right now   Orders:  -     amLODIPine (NORVASC) 5 mg tablet; Take 1 Tablet by mouth daily. Indications: high blood pressure, Normal, Disp-30 Tablet, R-1  3. Systolic murmur  Assessment & Plan:  Recently had echo - normal except for mild scloersis of aortic valve cusps (echo done due to limited systemic sclerosis diagnosis)  4. Systemic sclerosis with limited cutaneous involvement ([address]  Assessment & Plan:   Labs done showed +Scl70 antibiodies.  Seen by Dr. Alfonso Rodriguez - symptoms consistent with limited systemic sclerosis  Recently had echo - normal except for mild scloersis of aortic valve cusps  PFTs - mild obstructive  Follow up with DR. Casillas       Benefits, risks, possible drug interactions, and side effects of all new medications were reviewed with the patient. Pt verbalized understanding. Return to clinic:  6 weeks for BP, osteoporosis    An electronic signature was used to authenticate this note. Jen Cordero MD  [facility]  3/9/2022    Future Appointments   Date Time Provider Nathaniel Quinn   3/0/2769  3:39 AM Scotty Ray MD [facility] BS AMB   5/24/2022  8:00 AM Daniela Benítez MD [facility] BS AMB      On this date 03/09/2022 I have spent 48 minutes reviewing previous notes, test results and face to face with the patient discussing the diagnosis and importance of compliance with the treatment plan as well as documenting on the day of the visit. Objective   Vitals:       Visit Vitals  BP (!) 145/81 (BP 1 Location: Left upper arm, BP Patient Position: Sitting, BP Cuff Size: Adult)   Pulse 67   Temp 97.9 °F (36.6 °C) (Temporal)   Resp 14   Ht 5' 3\" (1.6 m)   Wt 117 lb (53.1 kg)   SpO2 98%   BMI 20.73 kg/m²        Physical Exam  Constitutional:       Appearance: Normal appearance. She is not ill-appearing. Cardiovascular:      Rate and Rhythm: Normal rate and regular rhythm. Heart sounds: Murmur (3/6 systolic murmur, known) heard. No friction rub. No gallop. Pulmonary:      Effort: No respiratory distress. Breath sounds: Normal breath sounds. No wheezing, rhonchi or rales. Neurological:      Mental Status: She is alert. Current Outpatient Medications   Medication Sig    alendronate (FOSAMAX) 70 mg tablet Take 1 Tablet by mouth every seven (7) days. Indications: osteoporosis    amLODIPine (NORVASC) 5 mg tablet Take 1 Tablet by mouth daily. Indications: high blood pressure    calcium carbonate (Calcium 500) 500 mg calcium (1,250 mg) tablet Take 1 Tablet by mouth two (2) times daily (with meals).  Indications: osteoporosis    multivitamin (ONE A DAY) tablet Take 1 Tablet by mouth daily.  ascorbic acid, vitamin C, (VITAMIN C) 250 mg tablet Take  by mouth. otc    fish oil-omega-3 fatty acids 300-500 mg cap Take  by mouth. otc    cholecalciferol (VITAMIN D3) (2,000 UNITS /50 MCG) cap capsule Take  by mouth daily. otc    zinc sulfate (ZINC-15 PO) Take  by mouth. otc    vitamin e (E GEMS) 100 unit capsule Take  by mouth daily. otc    ergocalciferol (ERGOCALCIFEROL) 1,250 mcg (50,000 unit) capsule Take 1 Capsule by mouth every seven (7) days. Indications: low vitamin D levels (Patient not taking: Reported on 3/9/2022)    Svpgtgsv-Vjnx-Gwkrjl-Hyalur Ac 578-399-02-2 mg cap Take  by mouth. (Patient not taking: Reported on 10/11/2021)     No current facility-administered medications for this visit.

## 2022-03-09 NOTE — ASSESSMENT & PLAN NOTE
Secondary osteoporosis workup negative   Finished ergocal x 3 months  Calcium BID was constipating so went to daily  Now taking OTC vitamin D   Working on weight bearing exercises   Severe osteoporosis noted on DEXA. rec treatment. Discussed evenity/prolia vs bisphosphonates. She is concerned about side effects. Discussed risks/benefits of both. Decided to go with fosamax (shorter half life, possibility of stopping at 5 years, she fels it's less aggressive than evenity/prolia).   Ok to start 2 weeks after starting amlodipine if no SE with amlodipine and if vit D is normal

## 2022-03-09 NOTE — ASSESSMENT & PLAN NOTE
Recently had echo - normal except for mild scloersis of aortic valve cusps (echo done due to limited systemic sclerosis diagnosis)

## 2022-03-09 NOTE — ASSESSMENT & PLAN NOTE
Labs done showed +Scl70 antibiodies. Seen by Dr. Luis Patino - symptoms consistent with limited systemic sclerosis  Recently had echo - normal except for mild scloersis of aortic valve cusps  PFTs - mild obstructive  Follow up with DR. Casillas

## 2022-03-09 NOTE — PATIENT INSTRUCTIONS
Start amlodipine for blood pressure  If not having side effects with amlodipine, start fosamax in 2 weeks  Monitor your blood pressure at home 2-3 times a week. If persistently 140/90 or greater (either number), then please let us know.   Bring the cuff to your next appointment

## 2022-03-10 LAB — 25(OH)D3 SERPL-MCNC: 30.1 NG/ML (ref 30–100)

## 2022-03-10 RX ORDER — ERGOCALCIFEROL 1.25 MG/1
50000 CAPSULE ORAL
Qty: 12 CAPSULE | Refills: 1 | Status: SHIPPED | OUTPATIENT
Start: 2022-03-10 | End: 2022-10-25

## 2022-03-19 PROBLEM — M34.9: Status: ACTIVE | Noted: 2021-07-12

## 2022-03-19 PROBLEM — Z00.00 ROUTINE ADULT HEALTH MAINTENANCE: Status: ACTIVE | Noted: 2021-07-12

## 2022-03-19 PROBLEM — M81.0 OSTEOPOROSIS WITHOUT CURRENT PATHOLOGICAL FRACTURE, UNSPECIFIED OSTEOPOROSIS TYPE: Status: ACTIVE | Noted: 2021-10-11

## 2022-03-19 PROBLEM — I83.93 VARICOSE VEINS OF BOTH LOWER EXTREMITIES, UNSPECIFIED WHETHER COMPLICATED: Status: ACTIVE | Noted: 2021-07-12

## 2022-03-19 PROBLEM — R01.1 SYSTOLIC MURMUR: Status: ACTIVE | Noted: 2021-07-12

## 2022-03-20 PROBLEM — I10 PRIMARY HYPERTENSION: Status: ACTIVE | Noted: 2022-03-09

## 2022-04-06 ENCOUNTER — OFFICE VISIT (OUTPATIENT)
Dept: INTERNAL MEDICINE CLINIC | Age: 67
End: 2022-04-06
Payer: MEDICARE

## 2022-04-06 VITALS
WEIGHT: 118 LBS | HEIGHT: 63 IN | TEMPERATURE: 97.3 F | SYSTOLIC BLOOD PRESSURE: 132 MMHG | HEART RATE: 73 BPM | BODY MASS INDEX: 20.91 KG/M2 | RESPIRATION RATE: 14 BRPM | OXYGEN SATURATION: 99 % | DIASTOLIC BLOOD PRESSURE: 78 MMHG

## 2022-04-06 DIAGNOSIS — M81.0 OSTEOPOROSIS WITHOUT CURRENT PATHOLOGICAL FRACTURE, UNSPECIFIED OSTEOPOROSIS TYPE: ICD-10-CM

## 2022-04-06 DIAGNOSIS — I10 PRIMARY HYPERTENSION: ICD-10-CM

## 2022-04-06 DIAGNOSIS — R79.9 ABNORMAL FINDING OF BLOOD CHEMISTRY, UNSPECIFIED: ICD-10-CM

## 2022-04-06 PROCEDURE — G8427 DOCREV CUR MEDS BY ELIG CLIN: HCPCS | Performed by: INTERNAL MEDICINE

## 2022-04-06 PROCEDURE — G9899 SCRN MAM PERF RSLTS DOC: HCPCS | Performed by: INTERNAL MEDICINE

## 2022-04-06 PROCEDURE — 3017F COLORECTAL CA SCREEN DOC REV: CPT | Performed by: INTERNAL MEDICINE

## 2022-04-06 PROCEDURE — 99214 OFFICE O/P EST MOD 30 MIN: CPT | Performed by: INTERNAL MEDICINE

## 2022-04-06 PROCEDURE — G8420 CALC BMI NORM PARAMETERS: HCPCS | Performed by: INTERNAL MEDICINE

## 2022-04-06 PROCEDURE — G8510 SCR DEP NEG, NO PLAN REQD: HCPCS | Performed by: INTERNAL MEDICINE

## 2022-04-06 PROCEDURE — G8754 DIAS BP LESS 90: HCPCS | Performed by: INTERNAL MEDICINE

## 2022-04-06 PROCEDURE — G8536 NO DOC ELDER MAL SCRN: HCPCS | Performed by: INTERNAL MEDICINE

## 2022-04-06 PROCEDURE — 1101F PT FALLS ASSESS-DOCD LE1/YR: CPT | Performed by: INTERNAL MEDICINE

## 2022-04-06 PROCEDURE — G8752 SYS BP LESS 140: HCPCS | Performed by: INTERNAL MEDICINE

## 2022-04-06 PROCEDURE — 1090F PRES/ABSN URINE INCON ASSESS: CPT | Performed by: INTERNAL MEDICINE

## 2022-04-06 NOTE — PROGRESS NOTES
Note   Chief Complaint   Med follow up    Hafsa Mckeon is a 78 y.o. female     1. Osteoporosis without current pathological fracture, unspecified osteoporosis type  Assessment & Plan:  Took first dose last week  No SE  Cont fosamax weekly, no changes rec  Cont weekly ergo, recheck next visit  Orders:  -     VITAMIN D, 25 HYDROXY; Future  2. Primary hypertension  Assessment & Plan:   Feels better with BP medication  Feels like she can tell when her BP is high and now she's not feeling that anymore   No lightheadedness/dizziness, CP, SOB   Fluttery feeling is less   Avoid diuretics to prevent stim of RAAS; pt wants to avoid lisinopril due to already present cough  Well controlled, cont amlo 5, no changes rec  Orders:  -     CBC W/O DIFF; Future  -     METABOLIC PANEL, COMPREHENSIVE; Future  -     HEMOGLOBIN A1C WITH EAG; Future  -     LIPID PANEL; Future  3. Abnormal finding of blood chemistry, unspecified   -     HEMOGLOBIN A1C WITH EAG; Future       Benefits, risks, possible drug interactions, and side effects of all new medications were reviewed with the patient. Pt verbalized understanding. 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 Tiffany Capone MD  [facility]  4/6/2022    Future Appointments   Date Time Provider Nathaniel Quinn   5/24/2022  8:00 AM Raúl Olmos MD [facility] BS AMB   32/2/3416  0:04 AM Shae Han MD [facility] BS AMB        Objective   Vitals:       Visit Vitals  /78   Pulse 73   Temp 97.3 °F (36.3 °C) (Oral)   Resp 14   Ht 5' 3\" (1.6 m)   Wt 118 lb (53.5 kg)   SpO2 99%   BMI 20.90 kg/m²        Physical Exam  Constitutional:       Appearance: Normal appearance. She is not ill-appearing.    Cardiovascular:      Rate and Rhythm: Normal rate and regular rhythm. Heart sounds: No murmur heard. No friction rub. No gallop. Pulmonary:      Effort: No respiratory distress. Breath sounds: Normal breath sounds. No wheezing, rhonchi or rales. Neurological:      Mental Status: She is alert. Current Outpatient Medications   Medication Sig    ergocalciferol (ERGOCALCIFEROL) 1,250 mcg (50,000 unit) capsule Take 1 Capsule by mouth every seven (7) days.  alendronate (FOSAMAX) 70 mg tablet Take 1 Tablet by mouth every seven (7) days. Indications: osteoporosis    amLODIPine (NORVASC) 5 mg tablet Take 1 Tablet by mouth daily. Indications: high blood pressure    calcium carbonate (Calcium 500) 500 mg calcium (1,250 mg) tablet Take 1 Tablet by mouth two (2) times daily (with meals). Indications: osteoporosis    multivitamin (ONE A DAY) tablet Take 1 Tablet by mouth daily.  ascorbic acid, vitamin C, (VITAMIN C) 250 mg tablet Take  by mouth. otc    fish oil-omega-3 fatty acids 300-500 mg cap Take  by mouth. otc    cholecalciferol (VITAMIN D3) (2,000 UNITS /50 MCG) cap capsule Take  by mouth daily. otc    zinc sulfate (ZINC-15 PO) Take  by mouth. otc    vitamin e (E GEMS) 100 unit capsule Take  by mouth daily. otc    Hxesjgeq-Hmrw-Xvwroz-Hyalur Ac 832-540-10-2 mg cap Take  by mouth. (Patient not taking: Reported on 10/11/2021)     No current facility-administered medications for this visit.

## 2022-04-06 NOTE — ASSESSMENT & PLAN NOTE
Took first dose last week  No SE  Cont fosamax weekly, no changes rec  Cont weekly ergo, recheck next visit

## 2022-04-06 NOTE — ASSESSMENT & PLAN NOTE
Feels better with BP medication  Feels like she can tell when her BP is high and now she's not feeling that anymore   No lightheadedness/dizziness, CP, SOB   Fluttery feeling is less   Avoid diuretics to prevent stim of RAAS; pt wants to avoid lisinopril due to already present cough  Well controlled, cont amlo 5, no changes rec

## 2022-05-05 DIAGNOSIS — I10 PRIMARY HYPERTENSION: ICD-10-CM

## 2022-05-05 RX ORDER — AMLODIPINE BESYLATE 5 MG/1
TABLET ORAL
Qty: 90 TABLET | Refills: 1 | Status: SHIPPED | OUTPATIENT
Start: 2022-05-05 | End: 2022-10-25 | Stop reason: SDUPTHER

## 2022-05-23 NOTE — PROGRESS NOTES
REASON FOR VISIT:    is a 78 y.o. 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 HISTORY OF PRESENT ILLNESS    Pt returns for a follow up. Pt feels a lot better after starting her amlodipine. She takes 5 mg per day. She notes that her coughing, heart racing, tightness in her face, and general achiness are improved. She has also been working on leading a healthy lifestyle and trying to avoid sugar. Pt does not have a hx of asthma or seasonal allergies. She also denies feeling any wheezing or having trouble exhaling air. She denies ever using an albuterol inhaler. She does note that she has had a cough, which is a new development in the past couple of years. She does not follow up with a pulmonologist.     Pt reports that her PCP had her on fosamax, vitamin D, and calcium for her bone density. She notes that when she was taking calcium twice per day she was experiencing some constipation. She has cut back on taking 2 calcium pills per day and she does not have as much constipation currently. Pt reports that her fingers turn blue when they get cold. She knows how to manage this and it does not cause her problems in her daily life. Pt denies leg edema. REVIEW OF SYSTEMS  A comprehensive review of systems was negative except for that written in the HPI. A 10-point review of systems is per the new patient questionnaire, which has been reviewed extensively and scanned into the electronic medical record for future reference. Review of systems is as above and is otherwise negative. ALLERGIES  Patient has no known allergies.     MEDICATIONS  Current Outpatient Medications   Medication Sig    amLODIPine (NORVASC) 5 mg tablet TAKE 1 TABLET BY MOUTH EVERY DAY FOR BLOOD PRESSURE    alendronate (FOSAMAX) 70 mg tablet Take 1 Tablet by mouth every seven (7) days. Indications: osteoporosis    calcium carbonate (Calcium 500) 500 mg calcium (1,250 mg) tablet Take 1 Tablet by mouth two (2) times daily (with meals). Indications: osteoporosis    multivitamin (ONE A DAY) tablet Take 1 Tablet by mouth daily.  ascorbic acid, vitamin C, (VITAMIN C) 250 mg tablet Take  by mouth. otc    fish oil-omega-3 fatty acids 300-500 mg cap Take  by mouth. otc    cholecalciferol (VITAMIN D3) (2,000 UNITS /50 MCG) cap capsule Take  by mouth daily. otc    zinc sulfate (ZINC-15 PO) Take  by mouth. otc    vitamin e (E GEMS) 100 unit capsule Take  by mouth daily. otc    Ecpzclzr-Xhfe-Znvdyu-Hyalur Ac 805-097-85-2 mg cap Take  by mouth.  ergocalciferol (ERGOCALCIFEROL) 1,250 mcg (50,000 unit) capsule Take 1 Capsule by mouth every seven (7) days. (Patient not taking: Reported on 2022)     No current facility-administered medications for this visit. PAST MEDICAL HISTORY  History reviewed. No pertinent past medical history. 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 FAMILY HISTORY  family history includes Hypertension in her father; Other in her mother. Denies family history of SLE, rheumatoid arthritis, or scleroderma. SOCIAL HISTORY  She  reports that she has never smoked. She has never used smokeless tobacco. She reports current alcohol use. She reports that she does not use drugs. Social History     Social History Narrative    Not on file   Has 3 children, 4 grandchildren. Youngest son is 30yo. Works as  in [employer]. 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 No flowsheet data found.      General:  The patient is well developed, well nourished, alert, and in no apparent distress. Eyes: Sclera are anicteric. No conjunctival injection. HEENT: Mildly decreased oral aperture and facial tightening c/w scleroderma facies. Oropharynx is clear. No oral ulcers. Adequate salivary pooling. No cervical or supraclavicular lymphadenopathy. Lungs:  Clear to auscultation bilaterally, without crackles, wheeze, or stridor. Normal respiratory effort. Cor:  Regular rate and rhythm. 2/6 HSM at apex. Abdomen: Soft, non-tender, without hepatomegaly or masses. Extremities: No calf tenderness or edema. +varicose veins. Warm and well perfused. Skin: Mildly abnormal nailfold capillaries with thinning and dilated/bushy forms, no hemorrhage or edema. No significant sclerodactyly or puffy fingers today. No digital pits or calcinosis. Overall mRSS just 1 for facial changes. No telangiectasias. Neuro: Nonfocal, normal gait, no foot or wrist drop  Musculoskeletal:    A comprehensive musculoskeletal exam was performed for all joints of each upper and lower extremity and assessed for swelling, tenderness and range of motion. Results are documented as below:  Early [place] nodes. No evidence of synovitis in the small joints of the hands, wrists, shoulders, elbows, hips, knees or ankles. Labs:  3/19/22: Vit D 30.1  12/23/21: Haptoglobin 99, QuantiFERON plus negative, , HCV Ab <0.1, Hep B surface AB non reactive, Hep Be Ab negative, Heb B core Ab total negative, Heb B core Ab IgM negative, Hep Be antigen negative, Hep  Surface Ag screen negative,  ESR 11, Cr 0.71, LFT WNL, WBC 5.5, HGB 13.4, Plt 277, CRP <0.1  10/11/21: SPEP and GHADA WNL; Vit D 26.2, Ca 9.3;   7/12/21: WBC 5.1, Hgb 13.1, Plt 237; ESR 3, Cr 0.72, LFTs WNL;  Vit B12 1293, HDL 98, ; A1c 5.2; RDL Scl70+, [facility] direct SOFIA+ (no reflex)    PFTs:  2/9/22: FEV1/FVC 67%, FVC 3.61 (133%), FEV1 2.4L (123%); % pred, DLCO 87%    Echocardiograms:  2/90/22:  Dimas Travis  Left Ventricle: Left ventricle size is normal. Normal wall thickness. Unable to assess wall motion. Normal left ventricular systolic function with a visually estimated EF of 60 - 65%. Normal diastolic function.   Aortic Valve: Mild sclerosis of the aortic valve cusps. Normal RVSP. Imaging/Procedures:      9/30/21 DXA:  Femoral Neck Left:  Bone mineral density (gm/cm2): 0.688 g/cm?  % of peak bone mass: 66%  % for age matched controls: 86%  T-score: -2.5   Femoral Neck Right:  Bone mineral density (gm/cm2): 0.693 g/cm?  % of peak bone mass: 67%  % for age matched controls:  87%  T-score: -2.5   Total Hip Left:  Bone mineral density (gm/cm2): 0.702 g/cm?  % of peak bone mass: 70%  % for age matched controls: 86%  T-score: -2.4   Total Hip Right:  Bone mineral density (gm/cm2): 0.703 g/cm?  % of peak bone mass: 70%  % for age matched controls:  86%  T-score: -2.4   Lumbar Spine: L1-4 or specify  Bone mineral density (gm/cm2): 0.761 g/cm?  % of peak bone mass: 64%  % for age matched controls: 81%  T-score: -3.5   33% Radius Left:  Bone mineral density (gm/cm2): 0.507 g/cm?  % of peak bone mass: 58%  % for age matched controls:  67%  T-score: -4.2   IMPRESSION  This patient is osteoporotic using the [place] Frieze  Ms. Villagomez is a 78 y.o. 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 She has no clinical e/o interstitial lung disease or pulmonary hypertension, though compared to supranormal lung volumes DLCO may be slightly depressed--referring her to Dr. Porfirio Hale for monitoring lung function serially with Scl70+ given higher risk for furture lung declines, and possibly simply superimposed cough-variant asthma.        1. Systemic sclerosis with limited cutaneous involvement ([facility] [address]  - REFERRAL TO PULMONARY DISEASE  - C REACTIVE PROTEIN, QT; Future  - CBC WITH AUTOMATED DIFF; Future  - METABOLIC PANEL, COMPREHENSIVE; Future  - SED RATE (ESR); Future  - SOFIA COMPREHENSIVE PLUS PANEL; Future    2. Obstructive lung disease ([facility] [address]  - REFERRAL TO PULMONARY DISEASE      Patient Instructions   1) I think it is a good idea for you to check in with a pulmonologist at least one time per year to monitor your lung function. I will give you a referral for you to see Dr. Araceli Santos. You should also get a lung function test every 6 months so we are able to monitor if there are any changes. 2) You should continue to get an echocardiogram every 2 years. 3) Make sure you are getting at least 3 servings of calcium a day. Nut milks, leafy greens, nuts, and dairy are good sources of calcium. If you are getting 3+ servings through your diet you probably do not need to take any calcium pills. 4) Follow up in 6 months. Let me know if you have an increased cough or any problems with shortness of breath in the meantime. Orders Placed This Encounter    C REACTIVE PROTEIN, QT    CBC WITH AUTOMATED DIFF    METABOLIC PANEL, COMPREHENSIVE    SED RATE (ESR)    SOFIA COMPREHENSIVE PLUS PANEL    [facility]       Medications: I am having Eri Villagomez maintain her multivitamin, ascorbic acid (vitamin C), fish oil-omega-3 fatty acids, cholecalciferol, zinc sulfate (ZINC-15 PO), vitamin e, Ltzqsuwd-Pnek-Ppihej-Hyalur Ac, calcium carbonate, alendronate, ergocalciferol, and amLODIPine. Follow up: Return in about 6 months (around 11/24/2022).     Face to face time: 30 minutes  Note preparation and records review day of service: 20 minutes  Total provider time day of service: 50 minutes    This was scribed by Harsh Landeros in the presence of Dr. Kerry Warner MD    Adult Rheumatology   [address] [place] [address], [address] [place] [address]   Phone [phone]  Fax [phone]

## 2022-05-24 ENCOUNTER — TELEPHONE (OUTPATIENT)
Dept: RHEUMATOLOGY | Age: 67
End: 2022-05-24

## 2022-05-24 ENCOUNTER — OFFICE VISIT (OUTPATIENT)
Dept: RHEUMATOLOGY | Age: 67
End: 2022-05-24
Payer: MEDICARE

## 2022-05-24 VITALS
DIASTOLIC BLOOD PRESSURE: 89 MMHG | SYSTOLIC BLOOD PRESSURE: 122 MMHG | HEART RATE: 62 BPM | TEMPERATURE: 97.9 F | BODY MASS INDEX: 21.12 KG/M2 | OXYGEN SATURATION: 98 % | RESPIRATION RATE: 18 BRPM | WEIGHT: 119.2 LBS | HEIGHT: 63 IN

## 2022-05-24 DIAGNOSIS — J44.9 OBSTRUCTIVE LUNG DISEASE (HCC): ICD-10-CM

## 2022-05-24 DIAGNOSIS — M34.9 SYSTEMIC SCLEROSIS WITH LIMITED CUTANEOUS INVOLVEMENT (HCC): Primary | ICD-10-CM

## 2022-05-24 PROCEDURE — G9899 SCRN MAM PERF RSLTS DOC: HCPCS | Performed by: INTERNAL MEDICINE

## 2022-05-24 PROCEDURE — G8427 DOCREV CUR MEDS BY ELIG CLIN: HCPCS | Performed by: INTERNAL MEDICINE

## 2022-05-24 PROCEDURE — 99215 OFFICE O/P EST HI 40 MIN: CPT | Performed by: INTERNAL MEDICINE

## 2022-05-24 PROCEDURE — 3017F COLORECTAL CA SCREEN DOC REV: CPT | Performed by: INTERNAL MEDICINE

## 2022-05-24 PROCEDURE — G8752 SYS BP LESS 140: HCPCS | Performed by: INTERNAL MEDICINE

## 2022-05-24 PROCEDURE — G8754 DIAS BP LESS 90: HCPCS | Performed by: INTERNAL MEDICINE

## 2022-05-24 PROCEDURE — G8536 NO DOC ELDER MAL SCRN: HCPCS | Performed by: INTERNAL MEDICINE

## 2022-05-24 PROCEDURE — 1101F PT FALLS ASSESS-DOCD LE1/YR: CPT | Performed by: INTERNAL MEDICINE

## 2022-05-24 PROCEDURE — 1090F PRES/ABSN URINE INCON ASSESS: CPT | Performed by: INTERNAL MEDICINE

## 2022-05-24 PROCEDURE — 1123F ACP DISCUSS/DSCN MKR DOCD: CPT | Performed by: INTERNAL MEDICINE

## 2022-05-24 PROCEDURE — G8510 SCR DEP NEG, NO PLAN REQD: HCPCS | Performed by: INTERNAL MEDICINE

## 2022-05-24 PROCEDURE — G8420 CALC BMI NORM PARAMETERS: HCPCS | Performed by: INTERNAL MEDICINE

## 2022-05-24 NOTE — PROGRESS NOTES
Chief Complaint   Patient presents with    Other     sclerosis     1. Have you been to the ER, urgent care clinic since your last visit? Hospitalized since your last visit? No    2. Have you seen or consulted any other health care providers outside of the [address] [place] [address] since your last visit? Include any pap smears or colon screening.  No

## 2022-05-24 NOTE — PATIENT INSTRUCTIONS
1) I think it is a good idea for you to check in with a pulmonologist at least one time per year to monitor your lung function. I will give you a referral for you to see Dr. Manisha Haley. You should also get a lung function test every 6 months so we are able to monitor if there are any changes. 2) You should continue to get an echocardiogram every 2 years. 3) Make sure you are getting at least 3 servings of calcium a day. Nut milks, leafy greens, nuts, and dairy are good sources of calcium. If you are getting 3+ servings through your diet you probably do not need to take any calcium pills. 4) Follow up in 6 months. Let me know if you have an increased cough or any problems with shortness of breath in the meantime.

## 2022-05-24 NOTE — TELEPHONE ENCOUNTER
Pt called stated she was referred to Dr. Manisha Wintersup pulmonary disease, she stated they will need her last office note and referral. Pt provided fax number [phone]. Faxed information to office.

## 2022-10-21 LAB
25(OH)D3 SERPL-MCNC: 21.4 NG/ML (ref 30–100)
ALBUMIN SERPL-MCNC: 4.1 G/DL (ref 3.5–5)
ALBUMIN/GLOB SERPL: 1.2 {RATIO} (ref 1.1–2.2)
ALP SERPL-CCNC: 55 U/L (ref 45–117)
ALT SERPL-CCNC: 25 U/L (ref 12–78)
ANION GAP SERPL CALC-SCNC: 7 MMOL/L (ref 5–15)
AST SERPL-CCNC: 14 U/L (ref 15–37)
BILIRUB SERPL-MCNC: 0.4 MG/DL (ref 0.2–1)
BUN SERPL-MCNC: 14 MG/DL (ref 6–20)
BUN/CREAT SERPL: 20 (ref 12–20)
CALCIUM SERPL-MCNC: 9.3 MG/DL (ref 8.5–10.1)
CHLORIDE SERPL-SCNC: 109 MMOL/L (ref 97–108)
CHOLEST SERPL-MCNC: 216 MG/DL
CO2 SERPL-SCNC: 28 MMOL/L (ref 21–32)
CREAT SERPL-MCNC: 0.69 MG/DL (ref 0.55–1.02)
ERYTHROCYTE [DISTWIDTH] IN BLOOD BY AUTOMATED COUNT: 12.2 % (ref 11.5–14.5)
EST. AVERAGE GLUCOSE BLD GHB EST-MCNC: 100 MG/DL
GLOBULIN SER CALC-MCNC: 3.3 G/DL (ref 2–4)
GLUCOSE SERPL-MCNC: 98 MG/DL (ref 65–100)
HBA1C MFR BLD: 5.1 % (ref 4–5.6)
HCT VFR BLD AUTO: 39.5 % (ref 35–47)
HDLC SERPL-MCNC: 87 MG/DL
HDLC SERPL: 2.5 {RATIO} (ref 0–5)
HGB BLD-MCNC: 13.1 G/DL (ref 11.5–16)
LDLC SERPL CALC-MCNC: 106.4 MG/DL (ref 0–100)
MCH RBC QN AUTO: 29.8 PG (ref 26–34)
MCHC RBC AUTO-ENTMCNC: 33.2 G/DL (ref 30–36.5)
MCV RBC AUTO: 90 FL (ref 80–99)
NRBC # BLD: 0 K/UL (ref 0–0.01)
NRBC BLD-RTO: 0 PER 100 WBC
PLATELET # BLD AUTO: 256 K/UL (ref 150–400)
PMV BLD AUTO: 10.3 FL (ref 8.9–12.9)
POTASSIUM SERPL-SCNC: 3.9 MMOL/L (ref 3.5–5.1)
PROT SERPL-MCNC: 7.4 G/DL (ref 6.4–8.2)
RBC # BLD AUTO: 4.39 M/UL (ref 3.8–5.2)
SODIUM SERPL-SCNC: 144 MMOL/L (ref 136–145)
TRIGL SERPL-MCNC: 113 MG/DL (ref ?–150)
VLDLC SERPL CALC-MCNC: 22.6 MG/DL
WBC # BLD AUTO: 5.7 K/UL (ref 3.6–11)
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
## 2022-10-24 LAB
ALBUMIN SERPL-MCNC: 4.9 G/DL (ref 3.8–4.8)
ALBUMIN/GLOB SERPL: 2.2 {RATIO} (ref 1.2–2.2)
ALP SERPL-CCNC: 54 IU/L (ref 44–121)
ALT SERPL-CCNC: 15 IU/L (ref 0–32)
AST SERPL-CCNC: 17 IU/L (ref 0–40)
BASOPHILS # BLD AUTO: 0 X10E3/UL (ref 0–0.2)
BASOPHILS NFR BLD AUTO: 1 %
BILIRUB SERPL-MCNC: 0.3 MG/DL (ref 0–1.2)
BUN SERPL-MCNC: 15 MG/DL (ref 8–27)
BUN/CREAT SERPL: 20 (ref 12–28)
CALCIUM SERPL-MCNC: 9.5 MG/DL (ref 8.7–10.3)
CENTROMERE B AB SER-ACNC: <0.2 AI (ref 0–0.9)
CHLORIDE SERPL-SCNC: 106 MMOL/L (ref 96–106)
CHROMATIN AB SERPL-ACNC: <0.2 AI (ref 0–0.9)
CO2 SERPL-SCNC: 24 MMOL/L (ref 20–29)
CREAT SERPL-MCNC: 0.74 MG/DL (ref 0.57–1)
CRP SERPL-MCNC: <1 MG/L (ref 0–10)
DSDNA AB SER-ACNC: <1 IU/ML (ref 0–9)
EGFR: 89 ML/MIN/1.73
ENA JO1 AB SER-ACNC: <0.2 AI (ref 0–0.9)
ENA RNP AB SER-ACNC: <0.2 AI (ref 0–0.9)
ENA SCL70 AB SER-ACNC: 1.1 AI (ref 0–0.9)
ENA SM AB SER-ACNC: <0.2 AI (ref 0–0.9)
ENA SM+RNP AB SER-ACNC: <0.2 AI (ref 0–0.9)
ENA SS-A AB SER-ACNC: 0.2 AI (ref 0–0.9)
ENA SS-B AB SER-ACNC: <0.2 AI (ref 0–0.9)
EOSINOPHIL # BLD AUTO: 0 X10E3/UL (ref 0–0.4)
EOSINOPHIL NFR BLD AUTO: 1 %
ERYTHROCYTE [DISTWIDTH] IN BLOOD BY AUTOMATED COUNT: 12.4 % (ref 11.7–15.4)
GLOBULIN SER CALC-MCNC: 2.2 G/DL (ref 1.5–4.5)
GLUCOSE SERPL-MCNC: 95 MG/DL (ref 70–99)
HCT VFR BLD AUTO: 40.4 % (ref 34–46.6)
HGB BLD-MCNC: 12.9 G/DL (ref 11.1–15.9)
IMM GRANULOCYTES # BLD AUTO: 0 X10E3/UL (ref 0–0.1)
IMM GRANULOCYTES NFR BLD AUTO: 0 %
LYMPHOCYTES # BLD AUTO: 1.8 X10E3/UL (ref 0.7–3.1)
LYMPHOCYTES NFR BLD AUTO: 30 %
MCH RBC QN AUTO: 28.8 PG (ref 26.6–33)
MCHC RBC AUTO-ENTMCNC: 31.9 G/DL (ref 31.5–35.7)
MCV RBC AUTO: 90 FL (ref 79–97)
MONOCYTES # BLD AUTO: 0.4 X10E3/UL (ref 0.1–0.9)
MONOCYTES NFR BLD AUTO: 7 %
NEUTROPHILS # BLD AUTO: 3.6 X10E3/UL (ref 1.4–7)
NEUTROPHILS NFR BLD AUTO: 61 %
PLATELET # BLD AUTO: 260 X10E3/UL (ref 150–450)
POTASSIUM SERPL-SCNC: 4 MMOL/L (ref 3.5–5.2)
PROT SERPL-MCNC: 7.1 G/DL (ref 6–8.5)
RBC # BLD AUTO: 4.48 X10E6/UL (ref 3.77–5.28)
RIBOSOMAL P AB SER-ACNC: <0.2 AI (ref 0–0.9)
SEE BELOW:, 164879: ABNORMAL
SODIUM SERPL-SCNC: 144 MMOL/L (ref 134–144)
WBC # BLD AUTO: 5.9 X10E3/UL (ref 3.4–10.8)

## 2022-10-25 ENCOUNTER — OFFICE VISIT (OUTPATIENT)
Dept: INTERNAL MEDICINE CLINIC | Age: 67
End: 2022-10-25
Payer: MEDICARE

## 2022-10-25 VITALS
HEART RATE: 70 BPM | RESPIRATION RATE: 14 BRPM | OXYGEN SATURATION: 97 % | TEMPERATURE: 97.5 F | HEIGHT: 63 IN | SYSTOLIC BLOOD PRESSURE: 131 MMHG | WEIGHT: 120 LBS | BODY MASS INDEX: 21.26 KG/M2 | DIASTOLIC BLOOD PRESSURE: 74 MMHG

## 2022-10-25 DIAGNOSIS — M34.9 SYSTEMIC SCLEROSIS WITH LIMITED CUTANEOUS INVOLVEMENT (HCC): ICD-10-CM

## 2022-10-25 DIAGNOSIS — M81.0 OSTEOPOROSIS WITHOUT CURRENT PATHOLOGICAL FRACTURE, UNSPECIFIED OSTEOPOROSIS TYPE: ICD-10-CM

## 2022-10-25 DIAGNOSIS — E78.49 OTHER HYPERLIPIDEMIA: ICD-10-CM

## 2022-10-25 DIAGNOSIS — Z00.00 INITIAL MEDICARE ANNUAL WELLNESS VISIT: Primary | ICD-10-CM

## 2022-10-25 DIAGNOSIS — M54.50 CHRONIC MIDLINE LOW BACK PAIN WITHOUT SCIATICA: ICD-10-CM

## 2022-10-25 DIAGNOSIS — Z00.00 ROUTINE ADULT HEALTH MAINTENANCE: ICD-10-CM

## 2022-10-25 DIAGNOSIS — Z12.11 ENCOUNTER FOR SCREENING FOR MALIGNANT NEOPLASM OF COLON: ICD-10-CM

## 2022-10-25 DIAGNOSIS — I10 PRIMARY HYPERTENSION: ICD-10-CM

## 2022-10-25 DIAGNOSIS — G89.29 CHRONIC MIDLINE LOW BACK PAIN WITHOUT SCIATICA: ICD-10-CM

## 2022-10-25 PROCEDURE — G8427 DOCREV CUR MEDS BY ELIG CLIN: HCPCS | Performed by: INTERNAL MEDICINE

## 2022-10-25 PROCEDURE — G8420 CALC BMI NORM PARAMETERS: HCPCS | Performed by: INTERNAL MEDICINE

## 2022-10-25 PROCEDURE — 3017F COLORECTAL CA SCREEN DOC REV: CPT | Performed by: INTERNAL MEDICINE

## 2022-10-25 PROCEDURE — G0438 PPPS, INITIAL VISIT: HCPCS | Performed by: INTERNAL MEDICINE

## 2022-10-25 PROCEDURE — 1101F PT FALLS ASSESS-DOCD LE1/YR: CPT | Performed by: INTERNAL MEDICINE

## 2022-10-25 PROCEDURE — G8510 SCR DEP NEG, NO PLAN REQD: HCPCS | Performed by: INTERNAL MEDICINE

## 2022-10-25 PROCEDURE — G8536 NO DOC ELDER MAL SCRN: HCPCS | Performed by: INTERNAL MEDICINE

## 2022-10-25 PROCEDURE — 1090F PRES/ABSN URINE INCON ASSESS: CPT | Performed by: INTERNAL MEDICINE

## 2022-10-25 PROCEDURE — G8754 DIAS BP LESS 90: HCPCS | Performed by: INTERNAL MEDICINE

## 2022-10-25 PROCEDURE — G9899 SCRN MAM PERF RSLTS DOC: HCPCS | Performed by: INTERNAL MEDICINE

## 2022-10-25 PROCEDURE — G8752 SYS BP LESS 140: HCPCS | Performed by: INTERNAL MEDICINE

## 2022-10-25 PROCEDURE — 99214 OFFICE O/P EST MOD 30 MIN: CPT | Performed by: INTERNAL MEDICINE

## 2022-10-25 RX ORDER — ERGOCALCIFEROL 1.25 MG/1
50000 CAPSULE ORAL 2 TIMES WEEKLY
Qty: 24 CAPSULE | Refills: 0 | Status: SHIPPED | OUTPATIENT
Start: 2022-10-28

## 2022-10-25 RX ORDER — DICLOFENAC SODIUM 75 MG/1
75 TABLET, DELAYED RELEASE ORAL
Qty: 60 TABLET | Refills: 5 | Status: SHIPPED | OUTPATIENT
Start: 2022-10-25

## 2022-10-25 RX ORDER — DIAZEPAM 2 MG/1
2 TABLET ORAL
Qty: 10 TABLET | Refills: 1 | Status: SHIPPED | OUTPATIENT
Start: 2022-10-25

## 2022-10-25 RX ORDER — AMLODIPINE BESYLATE 5 MG/1
TABLET ORAL
Qty: 90 TABLET | Refills: 3 | Status: SHIPPED | OUTPATIENT
Start: 2022-10-25

## 2022-10-25 RX ORDER — ERGOCALCIFEROL 1.25 MG/1
50000 CAPSULE ORAL
Qty: 12 CAPSULE | Refills: 3 | Status: SHIPPED | OUTPATIENT
Start: 2022-10-25

## 2022-10-25 NOTE — ASSESSMENT & PLAN NOTE
Occl back pain  Uses naproxen as needed  Has taken 's diazepam before    has used a lot of different medications in the past and diazepam is what has worked, she would like to use the same thing  Feels like she would use the medication a couple times a month  Discussed benzo risk. Signed agreement. Diclofenac sent to pharmacy.   Diazepam also sent to pharmacy #10

## 2022-10-25 NOTE — ASSESSMENT & PLAN NOTE
Cologuard ordered-advised to verify with insurance to make sure covered  Declines mammogram  Discussed shingles, TDAP, PCV20  Declines ACP paperwork today

## 2022-10-25 NOTE — ASSESSMENT & PLAN NOTE
Tolerating Fosamax well. Continue. Vitamin D 21.   Recommend ergocalciferol twice a week for 3 months then switch to once weekly

## 2022-10-25 NOTE — ASSESSMENT & PLAN NOTE
well controlled, continue current medications   Amlodipine 5  Patient wants to avoid lisinopril due to already present cough, avoid diuretics or prevent stim of RAAS

## 2022-10-25 NOTE — ASSESSMENT & PLAN NOTE
monitored by specialist. No acute findings meriting change in the plan   PFTs every 6 months recommended by rheumatology in addition to echo every 2 years.   Has follow-up with Dr. Kacie Tomas scheduled tomorrow

## 2022-10-25 NOTE — PATIENT INSTRUCTIONS
Please call [phone] to determine if your insurance covers cologuard. Medicare Wellness Visit, Female     The best way to live healthy is to have a lifestyle where you eat a well-balanced diet, exercise regularly, limit alcohol use, and quit all forms of tobacco/nicotine, if applicable. Regular preventive services are another way to keep healthy. Preventive services (vaccines, screening tests, monitoring & exams) can help personalize your care plan, which helps you manage your own care. Screening tests can find health problems at the earliest stages, when they are easiest to treat. [facility] follows the current, evidence-based guidelines published by the [facility] Jeff Zak ([facility]) when recommending preventive services for our patients. Because we follow these guidelines, sometimes recommendations change over time as research supports it. (For example, mammograms used to be recommended annually. Even though Medicare will still pay for an annual mammogram, the newer guidelines recommend a mammogram every two years for women of average risk). Of course, you and your doctor may decide to screen more often for some diseases, based on your risk and your co-morbidities (chronic disease you are already diagnosed with). Preventive services for you include:  - Medicare offers their members a free annual wellness visit, which is time for you and your primary care provider to discuss and plan for your preventive service needs. Take advantage of this benefit every year!  -All adults over the age of 72 should receive the recommended pneumonia vaccines. Current USPSTF guidelines recommend a series of two vaccines for the best pneumonia protection.   -All adults should have a flu vaccine yearly and a tetanus vaccine every 10 years.   -All adults age 48 and older should receive the shingles vaccines (series of two vaccines).       -All adults age 38-68 who are overweight should have a diabetes screening test once every three years.   -All adults born between [birth year] and 1964 should be screened once for Hepatitis C.  -Other screening tests and preventive services for persons with diabetes include: an eye exam to screen for diabetic retinopathy, a kidney function test, a foot exam, and stricter control over your cholesterol.   -Cardiovascular screening for adults with routine risk involves an electrocardiogram (ECG) at intervals determined by your doctor.   -Colorectal cancer screenings should be done for adults age 54-65 with no increased risk factors for colorectal cancer. There are a number of acceptable methods of screening for this type of cancer. Each test has its own benefits and drawbacks. Discuss with your doctor what is most appropriate for you during your annual wellness visit. The different tests include: colonoscopy (considered the best screening method), a fecal occult blood test, a fecal DNA test, and sigmoidoscopy.    -A bone mass density test is recommended when a woman turns 65 to screen for osteoporosis. This test is only recommended one time, as a screening. Some providers will use this same test as a disease monitoring tool if you already have osteoporosis. -Breast cancer screenings are recommended every other year for women of normal risk, age 54-69.  -Cervical cancer screenings for women over age 72 are only recommended with certain risk factors.      Here is a list of your current Health Maintenance items (your personalized list of preventive services) with a due date:  Health Maintenance Due   Topic Date Due    Pneumococcal Vaccine (1 - PCV) Never done    DTaP/Tdap/Td  (1 - Tdap) Never done    Shingles Vaccine (1 of 2) Never done    Mammogram  Never done    COVID-19 Vaccine (4 - Booster for [employer] series) 06/23/2022    Colorectal Screening  07/15/2022    Yearly Flu Vaccine (1) Never done

## 2022-11-24 PROBLEM — Z00.00 INITIAL MEDICARE ANNUAL WELLNESS VISIT: Status: RESOLVED | Noted: 2021-07-12 | Resolved: 2022-11-24

## 2022-11-29 ENCOUNTER — OFFICE VISIT (OUTPATIENT)
Dept: RHEUMATOLOGY | Age: 67
End: 2022-11-29
Payer: MEDICARE

## 2022-11-29 VITALS
OXYGEN SATURATION: 98 % | DIASTOLIC BLOOD PRESSURE: 76 MMHG | BODY MASS INDEX: 21.08 KG/M2 | WEIGHT: 119 LBS | RESPIRATION RATE: 16 BRPM | TEMPERATURE: 98.1 F | HEART RATE: 63 BPM | SYSTOLIC BLOOD PRESSURE: 132 MMHG

## 2022-11-29 DIAGNOSIS — M34.9 SYSTEMIC SCLEROSIS WITH LIMITED CUTANEOUS INVOLVEMENT (HCC): Primary | ICD-10-CM

## 2022-11-29 DIAGNOSIS — M81.0 OSTEOPOROSIS WITHOUT CURRENT PATHOLOGICAL FRACTURE, UNSPECIFIED OSTEOPOROSIS TYPE: ICD-10-CM

## 2022-11-29 DIAGNOSIS — I73.00 RAYNAUD'S PHENOMENON WITHOUT GANGRENE: ICD-10-CM

## 2022-11-29 PROCEDURE — G8427 DOCREV CUR MEDS BY ELIG CLIN: HCPCS | Performed by: INTERNAL MEDICINE

## 2022-11-29 PROCEDURE — 99215 OFFICE O/P EST HI 40 MIN: CPT | Performed by: INTERNAL MEDICINE

## 2022-11-29 PROCEDURE — G8420 CALC BMI NORM PARAMETERS: HCPCS | Performed by: INTERNAL MEDICINE

## 2022-11-29 PROCEDURE — 3074F SYST BP LT 130 MM HG: CPT | Performed by: INTERNAL MEDICINE

## 2022-11-29 PROCEDURE — G9899 SCRN MAM PERF RSLTS DOC: HCPCS | Performed by: INTERNAL MEDICINE

## 2022-11-29 PROCEDURE — 1123F ACP DISCUSS/DSCN MKR DOCD: CPT | Performed by: INTERNAL MEDICINE

## 2022-11-29 PROCEDURE — G8754 DIAS BP LESS 90: HCPCS | Performed by: INTERNAL MEDICINE

## 2022-11-29 PROCEDURE — G8752 SYS BP LESS 140: HCPCS | Performed by: INTERNAL MEDICINE

## 2022-11-29 PROCEDURE — G8536 NO DOC ELDER MAL SCRN: HCPCS | Performed by: INTERNAL MEDICINE

## 2022-11-29 PROCEDURE — G8510 SCR DEP NEG, NO PLAN REQD: HCPCS | Performed by: INTERNAL MEDICINE

## 2022-11-29 PROCEDURE — 3078F DIAST BP <80 MM HG: CPT | Performed by: INTERNAL MEDICINE

## 2022-11-29 PROCEDURE — 1101F PT FALLS ASSESS-DOCD LE1/YR: CPT | Performed by: INTERNAL MEDICINE

## 2022-11-29 PROCEDURE — 1090F PRES/ABSN URINE INCON ASSESS: CPT | Performed by: INTERNAL MEDICINE

## 2022-11-29 PROCEDURE — 3017F COLORECTAL CA SCREEN DOC REV: CPT | Performed by: INTERNAL MEDICINE

## 2022-11-29 NOTE — PROGRESS NOTES
REASON FOR VISIT:    is a 78 y.o. 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 HISTORY OF PRESENT ILLNESS    Pt returns for a follow up.  5/24/2022. Pt says that she feels OK overall. She notes that every once in a while she has days where she has increased joint pain. No persistent joint pain or swelling. Pt reports that she still gets cold color changes. She notes that her hands turn blue in the cold, but she does not feel any pain. She still takes 5mg of Amlodipine daily without lightheadedness or constipation. Cold blanching persists until she can get to a warm dry place. Pt denies breathing problems, constipation, dry mouth, acid reflux. Pt says that her eyes become dry at night sometimes. She uses over the counter artificial tears. Pt says that she has not exercised in about a year, but she tries to hit her step goal and stretch when she can. She recalls that she used to go to the gym 5 days a week and follow \"house fit\" videos, hasn't yet reestablished with good habits since her move and increased recent work demands. Pt recently restarted vitamin D supplementation because her labs showed that her level was low. Will be starting twice a week 50K unit supplementation through her PCP. No longer takes calcium supplements. REVIEW OF SYSTEMS  A comprehensive review of systems was negative except for that written in the HPI. A 10-point review of systems is per the new patient questionnaire, which has been reviewed extensively and scanned into the electronic medical record for future reference. Review of systems is as above and is otherwise negative. ALLERGIES  Patient has no known allergies.     MEDICATIONS  Current Outpatient Medications   Medication Sig    OTHER PCV20    ergocalciferol (ERGOCALCIFEROL) 1,250 mcg (50,000 unit) capsule Take 1 Capsule by mouth two (2) times a week. ergocalciferol (ERGOCALCIFEROL) 1,250 mcg (50,000 unit) capsule Take 1 Capsule by mouth every seven (7) days. Indications: low vitamin D levels    diazePAM (VALIUM) 2 mg tablet Take 1 Tablet by mouth every six (6) hours as needed for Muscle Spasm(s). Max Daily Amount: 8 mg. amLODIPine (NORVASC) 5 mg tablet TAKE 1 TABLET BY MOUTH EVERY DAY FOR BLOOD PRESSURE  Indications: high blood pressure    diclofenac EC (VOLTAREN) 75 mg EC tablet Take 1 Tablet by mouth two (2) times daily as needed for Pain. alendronate (FOSAMAX) 70 mg tablet Take 1 Tablet by mouth every seven (7) days. Indications: osteoporosis    calcium carbonate (Calcium 500) 500 mg calcium (1,250 mg) tablet Take 1 Tablet by mouth two (2) times daily (with meals). Indications: osteoporosis    multivitamin (ONE A DAY) tablet Take 1 Tablet by mouth daily. ascorbic acid, vitamin C, (VITAMIN C) 250 mg tablet Take  by mouth. otc    fish oil-omega-3 fatty acids 300-500 mg cap Take  by mouth. otc    zinc sulfate (ZINC-15 PO) Take  by mouth. otc    vitamin e (E GEMS) 100 unit capsule Take  by mouth daily. otc     No current facility-administered medications for this visit. PAST MEDICAL HISTORY  No past medical history on file. 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 FAMILY HISTORY  family history includes Hypertension in her father; Other in her mother. Denies family history of SLE, rheumatoid arthritis, or scleroderma. SOCIAL HISTORY  She  reports that she has never smoked. She has never used smokeless tobacco. She reports current alcohol use of about 5.0 standard drinks per week. She reports that she does not use drugs. Social History     Social History Narrative    Not on file   Has 3 children, 4 grandchildren. Youngest son is 32yo. Works as  in [employer].    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 Eyes: Sclera are anicteric. No conjunctival injection. Mildly decreased tear meniscus. HEENT: Mildly decreased oral aperture and facial tightening c/w scleroderma facies. Mild posterior oropharyngeal erythema. No oral ulcers. Adequate salivary pooling. No cervical or supraclavicular lymphadenopathy. Lungs:  Clear to auscultation bilaterally, without crackles, wheeze, or stridor. Normal respiratory effort. Cor:  Regular rate and rhythm. No murmurs today  Abdomen: Soft, non-tender, without hepatomegaly or masses. Extremities: No calf tenderness or edema. +varicose veins. Warm and well perfused. Skin: Previously mildly abnormal nailfold capillaries with thinning and dilated/bushy forms, no hemorrhage or edema, not reexamined today. No significant sclerodactyly or puffy fingers today. No digital pits or calcinosis. mRSS 0. No telangiectasias. Neuro: Nonfocal, normal gait, no foot or wrist drop  Musculoskeletal:    A comprehensive musculoskeletal exam was performed for all joints of each upper and lower extremity and assessed for swelling, tenderness and range of motion. Results are documented as below:  Early [place] nodes. No evidence of synovitis in the small joints of the hands, wrists, shoulders, elbows, hips, knees or ankles.        Labs:  10/20/22: Scleroderma-70 Ab 1.1, Cr 0.74, LFT WNL, CBC WNL, CRP <1 mg/L, CBC WNL, Cr .69, LFT WNL, HGB A1c 5.1, Cholesterol 216, .4, Vit D 21.4  3/19/22: Vit D 30.1  12/23/21: Haptoglobin 99, QuantiFERON plus negative, , HCV Ab <0.1, Hep B surface AB non reactive, Hep Be Ab negative, Heb B core Ab total negative, Heb B core Ab IgM negative, Hep Be antigen negative, Hep  Surface Ag screen negative,  ESR 11, Cr 0.71, LFT WNL, WBC 5.5, HGB 13.4, Plt 277, CRP <0.1  10/11/21: SPEP and GHADA WNL; Vit D 26.2, Ca 9.3;   7/12/21: WBC 5.1, Hgb 13.1, Plt 237; ESR 3, Cr 0.72, LFTs WNL; Vit B12 1293, HDL 98, ; A1c 5.2; RDL Scl70+, [facility] direct SOFIA+ (no reflex)    PFTs:  2/9/22: FEV1/FVC 67%, FVC 3.61 (133%), FEV1 2.4L (123%); % pred, DLCO 87%    Echocardiograms:  2/90/22:    Left Ventricle: Left ventricle size is normal. Normal wall thickness. Unable to assess wall motion. Normal left ventricular systolic function with a visually estimated EF of 60 - 65%. Normal diastolic function. Aortic Valve: Mild sclerosis of the aortic valve cusps. Normal RVSP. Imaging/Procedures:      9/30/21 DXA:  Femoral Neck Left:  Bone mineral density (gm/cm2): 0.688 g/cm?  % of peak bone mass: 66%  % for age matched controls: 86%  T-score: -2.5   Femoral Neck Right:  Bone mineral density (gm/cm2): 0.693 g/cm?  % of peak bone mass: 67%  % for age matched controls:  87%  T-score: -2.5   Total Hip Left:  Bone mineral density (gm/cm2): 0.702 g/cm?  % of peak bone mass: 70%  % for age matched controls: 86%  T-score: -2.4   Total Hip Right:  Bone mineral density (gm/cm2): 0.703 g/cm?  % of peak bone mass: 70%  % for age matched controls:  86%  T-score: -2.4   Lumbar Spine: L1-4 or specify  Bone mineral density (gm/cm2): 0.761 g/cm?  % of peak bone mass: 64%  % for age matched controls: 81%  T-score: -3.5   33% Radius Left:  Bone mineral density (gm/cm2): 0.507 g/cm?  % of peak bone mass: 58%  % for age matched controls:  67%  T-score: -4.2   IMPRESSION  This patient is osteoporotic using the [place] [facility]  MsCorazon Villagomez is a 78 y.o. 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  She has established with Dr. Tenzin Nicole in Pulmonary but continues to do well from a respiratory standpoint. Reviewed importance of regular exercise. Her PCP is managing her osteoporosis with Fosamax which she is tolerating well. We reviewed the risk of reflux with this and an attenuate risk of lung injury, but she has no symptomatic reflux or decrease in lung function. 1. Systemic sclerosis with limited cutaneous involvement (Mehreen Champion assistance with longitudinal pulmonary monitoring, PFTs upcoming  - C REACTIVE PROTEIN, QT; Future  - CBC WITH AUTOMATED DIFF; Future  - METABOLIC PANEL, COMPREHENSIVE; Future  - SED RATE (ESR); Future  - LD; Future    2. Raynaud's phenomenon without gangrene  - Cont amlodipine 5mg daily  - CBC WITH AUTOMATED DIFF; Future  - METABOLIC PANEL, COMPREHENSIVE; Future  - LD; Future    3. Osteoporosis without current pathological fracture, unspecified osteoporosis type  - Cont Fosamax as per Dr. Judy Bonner   - PCP monitoring Vit D supplementation  - Reviewed 3-4 high-calcium foods/day target, no calcium supplements      Patient Instructions   1) Continue to take 5mg of Amlodipine daily. 2) Continue to take weekly Fosamax. 3) Continue to take your vitamin D supplement as prescribed. Make sure that you are followed closely by your PCP if you are prescribed two pills weekly. 4) You can take 650mg of Tylenol up to 3 times a day for joint pain. Avoid taking NSAIDs if possible, but you can take an occasional Aleve or Diclofenac for breakthrough pain. 5) Work on staying active. 30 minutes daily of increased cardiovascular activity is recommended. 6) Ask your pulmonologist about scheduling your lung function test.     7) Check labs in 6 months. 8) Follow up in 12 months. Let me know if you have any questions or concerns in the meantime.        Orders Placed This Encounter    C REACTIVE PROTEIN, QT    CBC WITH AUTOMATED DIFF    METABOLIC PANEL, COMPREHENSIVE SED RATE (ESR)    LD       Medications: I have discontinued Sophie Franklin. Chuchic's calcium carbonate. I am also having her maintain her multivitamin, ascorbic acid (vitamin C), fish oil-omega-3 fatty acids, zinc sulfate (ZINC-15 PO), vitamin e, alendronate, OTHER, ergocalciferol, ergocalciferol, diazePAM, amLODIPine, and diclofenac EC. Follow up: Return in about 1 year (around 11/29/2023). Face to face time: 28 minutes  Note preparation and records review day of service: 20 minutes  Total provider time day of service: 48 minutes    This was scribed by Herman Traore in the presence of Dr. Krys Stubbs. The note was reviewed and amended personally, and I agree with the above information.     Taiwo Thurman MD    Adult Rheumatology   [facility]  A [facility], [address] [place] [address]   Phone [phone]  Fax [phone]

## 2022-11-29 NOTE — PROGRESS NOTES
Chief Complaint   Patient presents with    Joint Pain     1. Have you been to the ER, urgent care clinic since your last visit? Hospitalized since your last visit? No    2. Have you seen or consulted any other health care providers outside of the [address] [place] [address] since your last visit? Include any pap smears or colon screening.  No

## 2022-11-29 NOTE — PATIENT INSTRUCTIONS
1) Continue to take 5mg of Amlodipine daily. 2) Continue to take weekly Fosamax. 3) Continue to take your vitamin D supplement as prescribed. Make sure that you are followed closely by your PCP if you are prescribed two pills weekly. 4) You can take 650mg of Tylenol up to 3 times a day for joint pain. Avoid taking NSAIDs if possible, but you can take an occasional Aleve or Diclofenac for breakthrough pain. 5) Work on staying active. 30 minutes daily of increased cardiovascular activity is recommended. 6) Ask your pulmonologist about scheduling your lung function test.     7) Check labs in 6 months. 8) Follow up in 12 months. Let me know if you have any questions or concerns in the meantime.

## 2022-11-29 NOTE — LETTER
11/29/2022    Patient: Linda Werner   YOB: 1955   Date of Visit: 11/29/2022     [facility], [address]  [address]  [facility] [address]  [address]  [address] [place]    Dear MD Barry Mendez MD,    We recently saw Ms. Bere Griffin in the [facility] for evaluation. My notes for this consultation are attached. If you have questions, please do not hesitate to call me. I look forward to following your patient along with you.       Sincerely,    Brittany Salvador MD [facility]  Cell: [phone]

## 2023-02-23 ENCOUNTER — TELEPHONE (OUTPATIENT)
Dept: FAMILY MEDICINE CLINIC | Age: 68
End: 2023-02-23

## 2023-02-23 NOTE — TELEPHONE ENCOUNTER
Called pt, and left a voice message, advising I was returning her call to schedule a new patient appointment. Advised new patients are scheduling in the fall and she can call office back to schedule.

## 2023-02-23 NOTE — TELEPHONE ENCOUNTER
----- Message from Shante Roper sent at 2/17/2023 11:18 AM EST -----  Subject: Appointment Request    Reason for Call: New Patient/New to Provider Appointment needed: New   Patient Request Appointment    QUESTIONS    Reason for appointment request? No appointments available during search     Additional Information for Provider? Trevon Manuel is a patient of Jeremiah Magen and she is leaving the practice and was recommended to   establish care at your office. She would like to see a female  and in   person appointment only.  Ifrah Mccrary preferred but will see another   female dr)  ---------------------------------------------------------------------------  --------------  [phone]  [phone]; OK to leave message on voicemail  ---------------------------------------------------------------------------  --------------  SCRIPT ANSWERS  COVID Screen: Michelle Mayers

## 2023-02-27 DIAGNOSIS — M81.0 OSTEOPOROSIS WITHOUT CURRENT PATHOLOGICAL FRACTURE, UNSPECIFIED OSTEOPOROSIS TYPE: ICD-10-CM

## 2023-02-27 RX ORDER — ALENDRONATE SODIUM 70 MG/1
70 TABLET ORAL
Qty: 12 TABLET | Refills: 3 | Status: SHIPPED | OUTPATIENT
Start: 2023-02-27

## 2023-04-03 ENCOUNTER — OFFICE VISIT (OUTPATIENT)
Dept: INTERNAL MEDICINE CLINIC | Age: 68
End: 2023-04-03
Payer: MEDICARE

## 2023-04-03 DIAGNOSIS — M34.9 SYSTEMIC SCLEROSIS WITH LIMITED CUTANEOUS INVOLVEMENT (HCC): ICD-10-CM

## 2023-04-03 DIAGNOSIS — G89.29 CHRONIC MIDLINE LOW BACK PAIN WITHOUT SCIATICA: ICD-10-CM

## 2023-04-03 DIAGNOSIS — M54.50 CHRONIC MIDLINE LOW BACK PAIN WITHOUT SCIATICA: ICD-10-CM

## 2023-04-03 DIAGNOSIS — M81.0 OSTEOPOROSIS WITHOUT CURRENT PATHOLOGICAL FRACTURE, UNSPECIFIED OSTEOPOROSIS TYPE: ICD-10-CM

## 2023-04-03 DIAGNOSIS — I10 PRIMARY HYPERTENSION: ICD-10-CM

## 2023-04-03 PROCEDURE — 3017F COLORECTAL CA SCREEN DOC REV: CPT | Performed by: INTERNAL MEDICINE

## 2023-04-03 PROCEDURE — 1090F PRES/ABSN URINE INCON ASSESS: CPT | Performed by: INTERNAL MEDICINE

## 2023-04-03 PROCEDURE — 99214 OFFICE O/P EST MOD 30 MIN: CPT | Performed by: INTERNAL MEDICINE

## 2023-04-03 PROCEDURE — G8536 NO DOC ELDER MAL SCRN: HCPCS | Performed by: INTERNAL MEDICINE

## 2023-04-03 PROCEDURE — G8427 DOCREV CUR MEDS BY ELIG CLIN: HCPCS | Performed by: INTERNAL MEDICINE

## 2023-04-03 PROCEDURE — G8510 SCR DEP NEG, NO PLAN REQD: HCPCS | Performed by: INTERNAL MEDICINE

## 2023-04-03 PROCEDURE — 1101F PT FALLS ASSESS-DOCD LE1/YR: CPT | Performed by: INTERNAL MEDICINE

## 2023-04-03 PROCEDURE — G8420 CALC BMI NORM PARAMETERS: HCPCS | Performed by: INTERNAL MEDICINE

## 2023-04-03 RX ORDER — ALENDRONATE SODIUM 70 MG/1
70 TABLET ORAL
Qty: 12 TABLET | Refills: 3 | Status: SHIPPED | OUTPATIENT
Start: 2023-04-03

## 2023-04-03 RX ORDER — AMLODIPINE BESYLATE 5 MG/1
TABLET ORAL
Qty: 90 TABLET | Refills: 3 | Status: SHIPPED | OUTPATIENT
Start: 2023-04-03

## 2023-04-03 RX ORDER — DIAZEPAM 2 MG/1
2 TABLET ORAL
Qty: 10 TABLET | Refills: 1 | Status: SHIPPED | OUTPATIENT
Start: 2023-04-03

## 2023-04-03 RX ORDER — DICLOFENAC SODIUM 75 MG/1
75 TABLET, DELAYED RELEASE ORAL
Qty: 60 TABLET | Refills: 5 | Status: SHIPPED | OUTPATIENT
Start: 2023-04-03

## 2023-04-03 NOTE — ASSESSMENT & PLAN NOTE
Continue Fosamax. Check vitamin D and adjust vitamin D supplement as needed.   Currently on once weekly, was previously on twice weekly for 3 months

## 2023-04-03 NOTE — PROGRESS NOTES
Note   Chief Complaint   Follow-up    Pasha Chou is a 80 y.o. female     1. Chronic midline low back pain without sciatica  Assessment & Plan:   well controlled, continue current medications   Diclofenac and diazepam as needed  Signed agreement  Orders:  -     diazePAM (VALIUM) 2 mg tablet; Take 1 Tablet by mouth every six (6) hours as needed for Muscle Spasm(s). Max Daily Amount: 8 mg., Normal, Disp-10 Tablet, R-1  -     diclofenac EC (VOLTAREN) 75 mg EC tablet; Take 1 Tablet by mouth two (2) times daily as needed for Pain., Normal, Disp-60 Tablet, R-5  2. Primary hypertension  Assessment & Plan:    well controlled, continue current medications   Amlodipine 5  Patient wants to avoid lisinopril due to already present cough, avoid diuretics or prevent stim of RAAS  Orders:  -     amLODIPine (NORVASC) 5 mg tablet; TAKE 1 TABLET BY MOUTH EVERY DAY FOR BLOOD PRESSURE  Indications: high blood pressure, Normal, Disp-90 Tablet, R-3  3. Osteoporosis without current pathological fracture, unspecified osteoporosis type  Assessment & Plan:  Continue Fosamax. Check vitamin D and adjust vitamin D supplement as needed. Currently on once weekly, was previously on twice weekly for 3 months  Orders:  -     VITAMIN D, 25 HYDROXY; Future  -     alendronate (FOSAMAX) 70 mg tablet; Take 1 Tablet by mouth every seven (7) days. Indications: osteoporosis, Normal, Disp-12 Tablet, R-3  4. Systemic sclerosis with limited cutaneous involvement ([facility] [address]  Assessment & Plan:   monitored by specialist. No acute findings meriting change in the plan   Had PFTs done recently with pulm, was normal  Has follow-up with rheumatology soon     Benefits, risks, possible drug interactions, and side effects of all new medications were reviewed with the patient. Pt verbalized understanding.     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   medrano retriever anne     An [address] [facility] was used to authenticate this note. Liam Espino MD  [facility]  4/3/2023    Future Appointments   Date Time Provider Nathaniel Quinn   4/14/2023  8:20 AM Paulette Mcallister MD [facility] BS AMB   48/18/0531  0:78 AM Renaee Mcardle, MD [facility] BS AMB   10/31/2023  9:30 AM Lili Cuellar MD [facility] BS AMB        Objective   Vitals:       Visit Vitals  /72 (BP 1 Location: Left upper arm, BP Patient Position: Sitting, BP Cuff Size: Small adult)   Pulse 65   Temp 97.8 °F (36.6 °C) (Oral)   Resp 14   Ht 5' 3\" (1.6 m)   Wt 119 lb (54 kg)   SpO2 99%   BMI 21.08 kg/m²        Physical Exam  Constitutional:       Appearance: Normal appearance. She is not ill-appearing. Cardiovascular:      Rate and Rhythm: Normal rate and regular rhythm. Heart sounds: Murmur (1/6 systolic murmur (known)) heard. No friction rub. No gallop. Pulmonary:      Effort: No respiratory distress. Breath sounds: Normal breath sounds. No wheezing, rhonchi or rales. Neurological:      Mental Status: She is alert. Current Outpatient Medications   Medication Sig    diazePAM (VALIUM) 2 mg tablet Take 1 Tablet by mouth every six (6) hours as needed for Muscle Spasm(s). Max Daily Amount: 8 mg. amLODIPine (NORVASC) 5 mg tablet TAKE 1 TABLET BY MOUTH EVERY DAY FOR BLOOD PRESSURE  Indications: high blood pressure    alendronate (FOSAMAX) 70 mg tablet Take 1 Tablet by mouth every seven (7) days. Indications: osteoporosis    diclofenac EC (VOLTAREN) 75 mg EC tablet Take 1 Tablet by mouth two (2) times daily as needed for Pain.    ergocalciferol (ERGOCALCIFEROL) 1,250 mcg (50,000 unit) capsule Take 1 Capsule by mouth every seven (7) days. Indications: low vitamin D levels     No current facility-administered medications for this visit.

## 2023-04-03 NOTE — ASSESSMENT & PLAN NOTE
monitored by specialist. No acute findings meriting change in the plan   Had PFTs done recently with pulm, was normal  Has follow-up with rheumatology soon

## 2023-04-14 PROBLEM — E55.9 VITAMIN D DEFICIENCY: Status: ACTIVE | Noted: 2023-04-14

## 2023-04-22 DIAGNOSIS — M34.9 SYSTEMIC SCLEROSIS WITH LIMITED CUTANEOUS INVOLVEMENT (HCC): Primary | ICD-10-CM

## 2023-04-23 DIAGNOSIS — I73.00 RAYNAUD'S PHENOMENON WITHOUT GANGRENE: ICD-10-CM

## 2023-04-23 DIAGNOSIS — M34.9 SYSTEMIC SCLEROSIS WITH LIMITED CUTANEOUS INVOLVEMENT (HCC): Primary | ICD-10-CM

## 2023-04-24 DIAGNOSIS — I73.00 RAYNAUD'S PHENOMENON WITHOUT GANGRENE: ICD-10-CM

## 2023-04-24 DIAGNOSIS — M34.9 SYSTEMIC SCLEROSIS WITH LIMITED CUTANEOUS INVOLVEMENT (HCC): Primary | ICD-10-CM

## 2023-05-09 DIAGNOSIS — M54.50 CHRONIC MIDLINE LOW BACK PAIN WITHOUT SCIATICA: Primary | ICD-10-CM

## 2023-05-09 DIAGNOSIS — G89.29 CHRONIC MIDLINE LOW BACK PAIN WITHOUT SCIATICA: Primary | ICD-10-CM

## 2023-05-09 RX ORDER — DIAZEPAM 2 MG/1
TABLET ORAL
Qty: 10 TABLET | Refills: 0 | Status: SHIPPED | OUTPATIENT
Start: 2023-05-09 | End: 2023-05-19

## 2023-05-15 RX ORDER — ERGOCALCIFEROL 1.25 MG/1
50000 CAPSULE ORAL
Qty: 12 CAPSULE | Refills: 0 | Status: SHIPPED | OUTPATIENT
Start: 2023-05-15

## 2023-06-19 RX ORDER — ERGOCALCIFEROL 1.25 MG/1
CAPSULE ORAL
Qty: 12 CAPSULE | Refills: 7 | Status: SHIPPED | OUTPATIENT
Start: 2023-06-19

## 2023-07-06 DIAGNOSIS — G89.29 CHRONIC MIDLINE LOW BACK PAIN WITHOUT SCIATICA: ICD-10-CM

## 2023-07-06 DIAGNOSIS — M54.50 CHRONIC MIDLINE LOW BACK PAIN WITHOUT SCIATICA: ICD-10-CM

## 2023-07-07 NOTE — TELEPHONE ENCOUNTER
Msg left of rx sig: Dr. Alverto Luevano has relocated and no longer with the practice. A letter was sent out in Feb 2023. Please establish care with a new pcp at another practice. Call the office for questions.

## 2023-07-09 RX ORDER — DIAZEPAM 2 MG/1
TABLET ORAL
Qty: 10 TABLET | Refills: 0 | OUTPATIENT
Start: 2023-07-09

## 2023-07-21 ENCOUNTER — PATIENT MESSAGE (OUTPATIENT)
Dept: OTHER | Facility: CLINIC | Age: 68
End: 2023-07-21

## 2023-08-30 RX ORDER — DICLOFENAC SODIUM 75 MG/1
TABLET, DELAYED RELEASE ORAL
Qty: 160 TABLET | Refills: 3 | OUTPATIENT
Start: 2023-08-30

## 2023-08-30 NOTE — TELEPHONE ENCOUNTER
Patient not yet established at our practice.  This needs to be managed by her prior practice until she would establish

## 2023-10-28 SDOH — HEALTH STABILITY: PHYSICAL HEALTH: ON AVERAGE, HOW MANY MINUTES DO YOU ENGAGE IN EXERCISE AT THIS LEVEL?: 30 MIN

## 2023-10-28 SDOH — HEALTH STABILITY: PHYSICAL HEALTH: ON AVERAGE, HOW MANY DAYS PER WEEK DO YOU ENGAGE IN MODERATE TO STRENUOUS EXERCISE (LIKE A BRISK WALK)?: 5 DAYS

## 2023-10-28 ASSESSMENT — SOCIAL DETERMINANTS OF HEALTH (SDOH)
WITHIN THE LAST YEAR, HAVE YOU BEEN HUMILIATED OR EMOTIONALLY ABUSED IN OTHER WAYS BY YOUR PARTNER OR EX-PARTNER?: NO
WITHIN THE LAST YEAR, HAVE YOU BEEN KICKED, HIT, SLAPPED, OR OTHERWISE PHYSICALLY HURT BY YOUR PARTNER OR EX-PARTNER?: NO
WITHIN THE LAST YEAR, HAVE YOU BEEN AFRAID OF YOUR PARTNER OR EX-PARTNER?: NO
WITHIN THE LAST YEAR, HAVE TO BEEN RAPED OR FORCED TO HAVE ANY KIND OF SEXUAL ACTIVITY BY YOUR PARTNER OR EX-PARTNER?: NO

## 2023-10-31 ENCOUNTER — OFFICE VISIT (OUTPATIENT)
Facility: CLINIC | Age: 68
End: 2023-10-31
Payer: MEDICARE

## 2023-10-31 VITALS
WEIGHT: 123 LBS | DIASTOLIC BLOOD PRESSURE: 68 MMHG | SYSTOLIC BLOOD PRESSURE: 136 MMHG | OXYGEN SATURATION: 98 % | RESPIRATION RATE: 20 BRPM | HEART RATE: 67 BPM | BODY MASS INDEX: 21.79 KG/M2 | TEMPERATURE: 97.7 F | HEIGHT: 63 IN

## 2023-10-31 DIAGNOSIS — I10 PRIMARY HYPERTENSION: ICD-10-CM

## 2023-10-31 DIAGNOSIS — R01.1 SYSTOLIC MURMUR: ICD-10-CM

## 2023-10-31 DIAGNOSIS — M34.9 SYSTEMIC SCLEROSIS WITH LIMITED CUTANEOUS INVOLVEMENT (HCC): ICD-10-CM

## 2023-10-31 DIAGNOSIS — Z76.89 ESTABLISHING CARE WITH NEW DOCTOR, ENCOUNTER FOR: ICD-10-CM

## 2023-10-31 DIAGNOSIS — I10 PRIMARY HYPERTENSION: Primary | ICD-10-CM

## 2023-10-31 DIAGNOSIS — M81.0 OSTEOPOROSIS WITHOUT CURRENT PATHOLOGICAL FRACTURE, UNSPECIFIED OSTEOPOROSIS TYPE: ICD-10-CM

## 2023-10-31 DIAGNOSIS — Z12.11 SCREENING FOR COLON CANCER: ICD-10-CM

## 2023-10-31 DIAGNOSIS — Z78.0 POSTMENOPAUSAL: ICD-10-CM

## 2023-10-31 PROCEDURE — 99204 OFFICE O/P NEW MOD 45 MIN: CPT | Performed by: FAMILY MEDICINE

## 2023-10-31 PROCEDURE — 3075F SYST BP GE 130 - 139MM HG: CPT | Performed by: FAMILY MEDICINE

## 2023-10-31 PROCEDURE — 3078F DIAST BP <80 MM HG: CPT | Performed by: FAMILY MEDICINE

## 2023-10-31 PROCEDURE — 1123F ACP DISCUSS/DSCN MKR DOCD: CPT | Performed by: FAMILY MEDICINE

## 2023-10-31 RX ORDER — ALENDRONATE SODIUM 70 MG/1
70 TABLET ORAL
Qty: 12 TABLET | Refills: 0 | Status: SHIPPED | OUTPATIENT
Start: 2023-10-31

## 2023-10-31 RX ORDER — PNEUMOCOCCAL 20-VALENT CONJUGATE VACCINE 2.2; 2.2; 2.2; 2.2; 2.2; 2.2; 2.2; 2.2; 2.2; 2.2; 2.2; 2.2; 2.2; 2.2; 2.2; 2.2; 4.4; 2.2; 2.2; 2.2 UG/.5ML; UG/.5ML; UG/.5ML; UG/.5ML; UG/.5ML; UG/.5ML; UG/.5ML; UG/.5ML; UG/.5ML; UG/.5ML; UG/.5ML; UG/.5ML; UG/.5ML; UG/.5ML; UG/.5ML; UG/.5ML; UG/.5ML; UG/.5ML; UG/.5ML; UG/.5ML
0.5 INJECTION, SUSPENSION INTRAMUSCULAR ONCE
Qty: 0.5 ML | Refills: 0 | Status: SHIPPED | OUTPATIENT
Start: 2023-10-31 | End: 2023-10-31

## 2023-10-31 RX ORDER — AMLODIPINE BESYLATE 5 MG/1
TABLET ORAL
Qty: 90 TABLET | Refills: 0 | Status: SHIPPED | OUTPATIENT
Start: 2023-10-31

## 2023-10-31 SDOH — ECONOMIC STABILITY: FOOD INSECURITY: WITHIN THE PAST 12 MONTHS, YOU WORRIED THAT YOUR FOOD WOULD RUN OUT BEFORE YOU GOT MONEY TO BUY MORE.: NEVER TRUE

## 2023-10-31 SDOH — ECONOMIC STABILITY: HOUSING INSECURITY
IN THE LAST 12 MONTHS, WAS THERE A TIME WHEN YOU DID NOT HAVE A STEADY PLACE TO SLEEP OR SLEPT IN A SHELTER (INCLUDING NOW)?: NO

## 2023-10-31 SDOH — ECONOMIC STABILITY: FOOD INSECURITY: WITHIN THE PAST 12 MONTHS, THE FOOD YOU BOUGHT JUST DIDN'T LAST AND YOU DIDN'T HAVE MONEY TO GET MORE.: NEVER TRUE

## 2023-10-31 SDOH — ECONOMIC STABILITY: INCOME INSECURITY: HOW HARD IS IT FOR YOU TO PAY FOR THE VERY BASICS LIKE FOOD, HOUSING, MEDICAL CARE, AND HEATING?: NOT HARD AT ALL

## 2023-10-31 ASSESSMENT — PATIENT HEALTH QUESTIONNAIRE - PHQ9
SUM OF ALL RESPONSES TO PHQ QUESTIONS 1-9: 0
SUM OF ALL RESPONSES TO PHQ QUESTIONS 1-9: 0
2. FEELING DOWN, DEPRESSED OR HOPELESS: 0
SUM OF ALL RESPONSES TO PHQ9 QUESTIONS 1 & 2: 0
SUM OF ALL RESPONSES TO PHQ QUESTIONS 1-9: 0
SUM OF ALL RESPONSES TO PHQ QUESTIONS 1-9: 0
1. LITTLE INTEREST OR PLEASURE IN DOING THINGS: 0

## 2023-10-31 NOTE — PROGRESS NOTES
Chief Complaint   Patient presents with    Establish Care     No concerns - est care
used is Siemens Advia Centaur currently standardized to a   Center of Disease Control and Prevention (CDC) certified reference   [address] [address]. Samples containing fluorescein dye can produce falsely   elevated values when tested with the ADVIA Centaur Vitamin D Assay. It is recommended that results in the toxic range, >100 ng/mL, be   retested 72 hours post fluorescein exposure. Mitzy Jennings MD  [facility]  10/31/23 10:24 AM    Portions of this note may have been populated using smart dictation software and may have \"sounds-like\" errors present. Pt was counseled on risks, benefits and alternatives of treatment options. All questions were asked and answered and the patient was agreeable with the treatment plan as outlined.

## 2023-10-31 NOTE — PATIENT INSTRUCTIONS
Well adult establishing care today  She will see rheumatology tomorrow  UTD on Echo--due again in February  PFT yearly with Dr Trip Jimenez for OP--recommend repeat Dexa  Recommend colon ca screening (cologuard preferred by patient)  Declines mammogram screening for now  Healthy high fiber diet  Regular exercise  Vit d 2000 / day, Ca 1200mg / day recommended for bone health  30 g / day fiber    Labs from last year and earlier this year were reviewed  Imaging historically reviewed as well    See me yearly or as needed

## 2023-11-14 ENCOUNTER — HOSPITAL ENCOUNTER (OUTPATIENT)
Facility: HOSPITAL | Age: 68
Discharge: HOME | End: 2023-11-17
Attending: FAMILY MEDICINE
Payer: MEDICARE

## 2023-11-14 ENCOUNTER — HOSPITAL ENCOUNTER (OUTPATIENT)
Facility: HOSPITAL | Age: 68
Discharge: HOME | End: 2023-11-16
Attending: FAMILY MEDICINE
Payer: MEDICARE

## 2023-11-14 VITALS
SYSTOLIC BLOOD PRESSURE: 146 MMHG | WEIGHT: 123 LBS | HEART RATE: 76 BPM | HEIGHT: 63 IN | BODY MASS INDEX: 21.79 KG/M2 | DIASTOLIC BLOOD PRESSURE: 98 MMHG

## 2023-11-14 DIAGNOSIS — M81.0 OSTEOPOROSIS WITHOUT CURRENT PATHOLOGICAL FRACTURE, UNSPECIFIED OSTEOPOROSIS TYPE: ICD-10-CM

## 2023-11-14 DIAGNOSIS — I10 PRIMARY HYPERTENSION: ICD-10-CM

## 2023-11-14 DIAGNOSIS — M34.9 SYSTEMIC SCLEROSIS WITH LIMITED CUTANEOUS INVOLVEMENT (HCC): ICD-10-CM

## 2023-11-14 DIAGNOSIS — R01.1 SYSTOLIC MURMUR: ICD-10-CM

## 2023-11-14 DIAGNOSIS — Z78.0 POSTMENOPAUSAL: ICD-10-CM

## 2023-11-14 LAB
CHOLEST SERPL-MCNC: 239 MG/DL
ECHO AO ARCH DIAM: 2.4 CM
ECHO AO ASC DIAM: 2.9 CM
ECHO AO ASCENDING AORTA INDEX: 1.85 CM/M2
ECHO AV AREA PEAK VELOCITY: 2 CM2
ECHO AV AREA/BSA PEAK VELOCITY: 1.3 CM2/M2
ECHO AV PEAK GRADIENT: 8 MMHG
ECHO AV PEAK VELOCITY: 1.4 M/S
ECHO AV VELOCITY RATIO: 0.71
ECHO BSA: 1.57 M2
ECHO LA DIAMETER INDEX: 2.17 CM/M2
ECHO LA DIAMETER: 3.4 CM
ECHO LA VOL A-L A2C: 23 ML (ref 22–52)
ECHO LA VOL A-L A4C: 27 ML (ref 22–52)
ECHO LA VOL BP: 23 ML (ref 22–52)
ECHO LA VOL MOD A2C: 22 ML (ref 22–52)
ECHO LA VOL MOD A4C: 24 ML (ref 22–52)
ECHO LA VOL/BSA BIPLANE: 15 ML/M2 (ref 16–34)
ECHO LA VOLUME AREA LENGTH: 25 ML
ECHO LA VOLUME INDEX A-L A2C: 15 ML/M2 (ref 16–34)
ECHO LA VOLUME INDEX A-L A4C: 17 ML/M2 (ref 16–34)
ECHO LA VOLUME INDEX AREA LENGTH: 16 ML/M2 (ref 16–34)
ECHO LA VOLUME INDEX MOD A2C: 14 ML/M2 (ref 16–34)
ECHO LA VOLUME INDEX MOD A4C: 15 ML/M2 (ref 16–34)
ECHO LV E' LATERAL VELOCITY: 13 CM/S
ECHO LV E' SEPTAL VELOCITY: 8 CM/S
ECHO LV EDV A2C: 60 ML
ECHO LV EDV A4C: 65 ML
ECHO LV EDV BP: 63 ML (ref 56–104)
ECHO LV EDV INDEX A4C: 41 ML/M2
ECHO LV EDV INDEX BP: 40 ML/M2
ECHO LV EDV NDEX A2C: 38 ML/M2
ECHO LV EJECTION FRACTION A2C: 62 %
ECHO LV EJECTION FRACTION A4C: 79 %
ECHO LV EJECTION FRACTION BIPLANE: 69 % (ref 55–100)
ECHO LV ESV A2C: 23 ML
ECHO LV ESV A4C: 14 ML
ECHO LV ESV BP: 20 ML (ref 19–49)
ECHO LV ESV INDEX A2C: 15 ML/M2
ECHO LV ESV INDEX A4C: 9 ML/M2
ECHO LV ESV INDEX BP: 13 ML/M2
ECHO LV FRACTIONAL SHORTENING: 40 % (ref 28–44)
ECHO LV INTERNAL DIMENSION DIASTOLE INDEX: 2.55 CM/M2
ECHO LV INTERNAL DIMENSION DIASTOLIC: 4 CM (ref 3.9–5.3)
ECHO LV INTERNAL DIMENSION SYSTOLIC INDEX: 1.53 CM/M2
ECHO LV INTERNAL DIMENSION SYSTOLIC: 2.4 CM
ECHO LV IVSD: 0.8 CM (ref 0.6–0.9)
ECHO LV MASS 2D: 93.5 G (ref 67–162)
ECHO LV MASS INDEX 2D: 59.5 G/M2 (ref 43–95)
ECHO LV POSTERIOR WALL DIASTOLIC: 0.8 CM (ref 0.6–0.9)
ECHO LV RELATIVE WALL THICKNESS RATIO: 0.4
ECHO LVOT AREA: 2.8 CM2
ECHO LVOT DIAM: 1.9 CM
ECHO LVOT MEAN GRADIENT: 3 MMHG
ECHO LVOT PEAK GRADIENT: 4 MMHG
ECHO LVOT PEAK VELOCITY: 1 M/S
ECHO LVOT STROKE VOLUME INDEX: 47.1 ML/M2
ECHO LVOT SV: 74 ML
ECHO LVOT VTI: 26.1 CM
ECHO MV A VELOCITY: 0.68 M/S
ECHO MV E DECELERATION TIME (DT): 247 MS
ECHO MV E VELOCITY: 0.73 M/S
ECHO MV E/A RATIO: 1.07
ECHO MV E/E' LATERAL: 5.62
ECHO MV E/E' RATIO (AVERAGED): 7.37
ECHO MV REGURGITANT PEAK GRADIENT: 117 MMHG
ECHO MV REGURGITANT PEAK VELOCITY: 5.4 M/S
ECHO PV MAX VELOCITY: 1.2 M/S
ECHO PV PEAK GRADIENT: 6 MMHG
ECHO RV FREE WALL PEAK S': 17 CM/S
ECHO RV INTERNAL DIMENSION: 3.8 CM
ECHO RV TAPSE: 2.2 CM (ref 1.7–?)
ECHO TV REGURGITANT MAX VELOCITY: 2.49 M/S
ECHO TV REGURGITANT PEAK GRADIENT: 25 MMHG
HDLC SERPL-MCNC: 75 MG/DL
HDLC SERPL: 3.2 (ref 0–5)
LDLC SERPL CALC-MCNC: 143.6 MG/DL (ref 0–100)
TRIGL SERPL-MCNC: 102 MG/DL
VLDLC SERPL CALC-MCNC: 20.4 MG/DL

## 2023-11-14 PROCEDURE — 77080 DXA BONE DENSITY AXIAL: CPT

## 2023-11-14 PROCEDURE — 93306 TTE W/DOPPLER COMPLETE: CPT

## 2023-11-15 ENCOUNTER — OFFICE VISIT (OUTPATIENT)
Age: 68
End: 2023-11-15

## 2023-11-15 VITALS
TEMPERATURE: 98.6 F | SYSTOLIC BLOOD PRESSURE: 127 MMHG | OXYGEN SATURATION: 98 % | RESPIRATION RATE: 18 BRPM | WEIGHT: 124.4 LBS | BODY MASS INDEX: 22.04 KG/M2 | DIASTOLIC BLOOD PRESSURE: 84 MMHG | HEART RATE: 70 BPM

## 2023-11-15 DIAGNOSIS — J06.9 VIRAL UPPER RESPIRATORY TRACT INFECTION: Primary | ICD-10-CM

## 2023-11-15 RX ORDER — PREDNISONE 20 MG/1
20 TABLET ORAL 2 TIMES DAILY
Qty: 10 TABLET | Refills: 0 | Status: SHIPPED | OUTPATIENT
Start: 2023-11-15 | End: 2023-11-20

## 2023-11-15 RX ORDER — GUAIFENESIN/DEXTROMETHORPHAN 100-10MG/5
5 SYRUP ORAL 3 TIMES DAILY PRN
Qty: 120 ML | Refills: 0 | Status: SHIPPED | OUTPATIENT
Start: 2023-11-15 | End: 2023-11-25

## 2023-11-15 RX ORDER — BENZONATATE 200 MG/1
200 CAPSULE ORAL 3 TIMES DAILY PRN
Qty: 21 CAPSULE | Refills: 0 | Status: SHIPPED | OUTPATIENT
Start: 2023-11-15 | End: 2023-11-22

## 2023-11-15 ASSESSMENT — ENCOUNTER SYMPTOMS
COUGH: 1
SHORTNESS OF BREATH: 0
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
## 2023-11-18 RX ORDER — GUAIFENESIN/DEXTROMETHORPHAN 100-10MG/5
5 SYRUP ORAL 3 TIMES DAILY PRN
Qty: 120 ML | Refills: 0 | Status: SHIPPED | OUTPATIENT
Start: 2023-11-18 | End: 2023-11-28

## 2023-11-18 RX ORDER — AZITHROMYCIN 250 MG/1
250 TABLET, FILM COATED ORAL SEE ADMIN INSTRUCTIONS
Qty: 6 TABLET | Refills: 0 | Status: SHIPPED | OUTPATIENT
Start: 2023-11-18 | End: 2023-11-23

## 2024-01-08 DIAGNOSIS — I10 PRIMARY HYPERTENSION: ICD-10-CM

## 2024-01-09 RX ORDER — AMLODIPINE BESYLATE 5 MG/1
TABLET ORAL
Qty: 90 TABLET | Refills: 3 | Status: SHIPPED | OUTPATIENT
Start: 2024-01-09

## 2024-01-18 ENCOUNTER — TELEPHONE (OUTPATIENT)
Age: 69
End: 2024-01-18

## 2024-01-18 NOTE — TELEPHONE ENCOUNTER
----- Message from April Nagle sent at 1/17/2024  3:05 PM EST -----  Subject: Appointment Request    Reason for Call: New Patient/New to Provider Appointment needed: New   Patient Request Appointment    QUESTIONS    Reason for appointment request? Requested Provider unavailable - dr. yordy de anda      Additional Information for Provider? Patients provider is leaving and she   would like to establish with dr. de anda now. no new patient appointments   when I searched. patient prefers an early morning appointment, not on a   thursday or monday morning appointment. patient ok with waiting for new   patient appointment. please call patient back to advise if she will   accept, thank you   ---------------------------------------------------------------------------  --------------  CALL BACK INFO  [phone]; OK to leave message on voicemail  ---------------------------------------------------------------------------  --------------  SCRIPT ANSWERS

## 2024-05-20 ENCOUNTER — OFFICE VISIT (OUTPATIENT)
Age: 69
End: 2024-05-20
Payer: MEDICARE

## 2024-05-20 VITALS
DIASTOLIC BLOOD PRESSURE: 70 MMHG | OXYGEN SATURATION: 98 % | BODY MASS INDEX: 21.55 KG/M2 | RESPIRATION RATE: 14 BRPM | WEIGHT: 121.6 LBS | HEART RATE: 62 BPM | SYSTOLIC BLOOD PRESSURE: 137 MMHG | HEIGHT: 63 IN

## 2024-05-20 DIAGNOSIS — I10 PRIMARY HYPERTENSION: Primary | ICD-10-CM

## 2024-05-20 DIAGNOSIS — M34.9 SYSTEMIC SCLEROSIS WITH LIMITED CUTANEOUS INVOLVEMENT (HCC): ICD-10-CM

## 2024-05-20 DIAGNOSIS — J41.0 SIMPLE CHRONIC BRONCHITIS (HCC): ICD-10-CM

## 2024-05-20 DIAGNOSIS — M54.50 CHRONIC LEFT-SIDED LOW BACK PAIN WITHOUT SCIATICA: ICD-10-CM

## 2024-05-20 DIAGNOSIS — G89.29 CHRONIC LEFT-SIDED LOW BACK PAIN WITHOUT SCIATICA: ICD-10-CM

## 2024-05-20 PROBLEM — M81.0 AGE-RELATED OSTEOPOROSIS WITHOUT CURRENT PATHOLOGICAL FRACTURE: Status: ACTIVE | Noted: 2021-10-11

## 2024-05-20 PROCEDURE — 3075F SYST BP GE 130 - 139MM HG: CPT | Performed by: INTERNAL MEDICINE

## 2024-05-20 PROCEDURE — 99214 OFFICE O/P EST MOD 30 MIN: CPT | Performed by: INTERNAL MEDICINE

## 2024-05-20 PROCEDURE — 3078F DIAST BP <80 MM HG: CPT | Performed by: INTERNAL MEDICINE

## 2024-05-20 PROCEDURE — 1123F ACP DISCUSS/DSCN MKR DOCD: CPT | Performed by: INTERNAL MEDICINE

## 2024-05-20 SDOH — HEALTH STABILITY: PHYSICAL HEALTH: ON AVERAGE, HOW MANY DAYS PER WEEK DO YOU ENGAGE IN MODERATE TO STRENUOUS EXERCISE (LIKE A BRISK WALK)?: 5 DAYS

## 2024-05-20 SDOH — HEALTH STABILITY: PHYSICAL HEALTH: ON AVERAGE, HOW MANY MINUTES DO YOU ENGAGE IN EXERCISE AT THIS LEVEL?: 20 MIN

## 2024-05-20 ASSESSMENT — PATIENT HEALTH QUESTIONNAIRE - PHQ9
SUM OF ALL RESPONSES TO PHQ9 QUESTIONS 1 & 2: 0
SUM OF ALL RESPONSES TO PHQ QUESTIONS 1-9: 0
2. FEELING DOWN, DEPRESSED OR HOPELESS: NOT AT ALL
SUM OF ALL RESPONSES TO PHQ QUESTIONS 1-9: 0
1. LITTLE INTEREST OR PLEASURE IN DOING THINGS: NOT AT ALL
SUM OF ALL RESPONSES TO PHQ QUESTIONS 1-9: 0
SUM OF ALL RESPONSES TO PHQ QUESTIONS 1-9: 0

## 2024-05-20 NOTE — PROGRESS NOTES
Karen Ridley (:  1955) is a 69 y.o. female,Established patient, here for evaluation of the following chief complaint(s):  New Patient      Assessment & Plan       1. Primary hypertension  Controlled on repeat. Continue amlodipine.     2. 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     3. Chronic left-sided low back pain without sciatica  Mild now, but recurrent. She prefers to monitor for now, with stretching, will follow up for PT orders and x-rays as desired.    4. Simple chronic bronchitis (HCC)  Minimal obstructive disease noted on her PFTs in , asymptomatic.        Follow up: 6 months for CPE       Subjective   HPI    HTN: Ms. Ridley is taking amlodipine for blood pressure daily without side effects. Denies chest pain, dyspnea, leg swelling, dizziness. Exercises with videos, variety of routines, about four times per week, and walks a lot.     She has been taking diclofenac if needed for her back--rarely uses them, with Aleve, if needed for her low back pain, on the left side mostly.     Osteoporosis: Her bone density test was just ordered, and she is taking alendronate.     She has seen a rheumatologist Dr. Balderrama for systemic sclerosis and Raynaud's symptoms. Her last appointment was a couple months ago. She has no lung involvement, has no history of dysphagia, GI involvement.       Current Outpatient Medications   Medication Instructions    alendronate (FOSAMAX) 70 MG tablet TAKE 1 TABLET BY MOUTH WEEKLY  WITH 8 OZ OF PLAIN WATER 30  MINUTES BEFORE FIRST FOOD, DRINK OR MEDS. STAY UPRIGHT FOR 30  MINS    amLODIPine (NORVASC) 5 MG tablet TAKE 1 TABLET BY MOUTH DAILY FOR BLOOD PRESSURE        No Known Allergies         Objective     BP (!) 147/88 (Site: Left Upper Arm, Position: Sitting, Cuff Size: Small Adult)   Pulse 62   Resp 14   Ht 1.6 m (5' 3\")   Wt 55.2 kg (121 lb 9.6 oz)   SpO2 98%   BMI 21.54 kg/m²      Repeat blood pressure:

## 2024-12-03 ENCOUNTER — OFFICE VISIT (OUTPATIENT)
Facility: CLINIC | Age: 69
End: 2024-12-03

## 2024-12-03 VITALS
RESPIRATION RATE: 14 BRPM | WEIGHT: 120.6 LBS | SYSTOLIC BLOOD PRESSURE: 146 MMHG | OXYGEN SATURATION: 98 % | HEART RATE: 63 BPM | HEIGHT: 63 IN | DIASTOLIC BLOOD PRESSURE: 76 MMHG | BODY MASS INDEX: 21.37 KG/M2

## 2024-12-03 DIAGNOSIS — Z12.31 ENCOUNTER FOR SCREENING MAMMOGRAM FOR BREAST CANCER: ICD-10-CM

## 2024-12-03 DIAGNOSIS — E55.9 VITAMIN D DEFICIENCY: ICD-10-CM

## 2024-12-03 DIAGNOSIS — Z12.11 COLON CANCER SCREENING: ICD-10-CM

## 2024-12-03 DIAGNOSIS — E78.49 OTHER HYPERLIPIDEMIA: ICD-10-CM

## 2024-12-03 DIAGNOSIS — I10 PRIMARY HYPERTENSION: ICD-10-CM

## 2024-12-03 DIAGNOSIS — Z00.00 MEDICARE ANNUAL WELLNESS VISIT, SUBSEQUENT: Primary | ICD-10-CM

## 2024-12-03 DIAGNOSIS — M81.0 AGE-RELATED OSTEOPOROSIS WITHOUT CURRENT PATHOLOGICAL FRACTURE: ICD-10-CM

## 2024-12-03 RX ORDER — DICLOFENAC SODIUM 75 MG/1
75 TABLET, DELAYED RELEASE ORAL 2 TIMES DAILY
COMMUNITY

## 2024-12-03 RX ORDER — ALENDRONATE SODIUM 70 MG/1
70 TABLET ORAL
Qty: 12 TABLET | Refills: 4 | Status: SHIPPED | OUTPATIENT
Start: 2024-12-03

## 2024-12-03 RX ORDER — AMLODIPINE BESYLATE 10 MG/1
10 TABLET ORAL DAILY
Qty: 90 TABLET | Refills: 4 | Status: SHIPPED | OUTPATIENT
Start: 2024-12-03

## 2024-12-03 SDOH — ECONOMIC STABILITY: INCOME INSECURITY: HOW HARD IS IT FOR YOU TO PAY FOR THE VERY BASICS LIKE FOOD, HOUSING, MEDICAL CARE, AND HEATING?: NOT HARD AT ALL

## 2024-12-03 SDOH — ECONOMIC STABILITY: FOOD INSECURITY: WITHIN THE PAST 12 MONTHS, THE FOOD YOU BOUGHT JUST DIDN'T LAST AND YOU DIDN'T HAVE MONEY TO GET MORE.: NEVER TRUE

## 2024-12-03 SDOH — ECONOMIC STABILITY: FOOD INSECURITY: WITHIN THE PAST 12 MONTHS, YOU WORRIED THAT YOUR FOOD WOULD RUN OUT BEFORE YOU GOT MONEY TO BUY MORE.: NEVER TRUE

## 2024-12-03 ASSESSMENT — PATIENT HEALTH QUESTIONNAIRE - PHQ9
SUM OF ALL RESPONSES TO PHQ QUESTIONS 1-9: 0
SUM OF ALL RESPONSES TO PHQ9 QUESTIONS 1 & 2: 0
SUM OF ALL RESPONSES TO PHQ QUESTIONS 1-9: 0
1. LITTLE INTEREST OR PLEASURE IN DOING THINGS: NOT AT ALL
2. FEELING DOWN, DEPRESSED OR HOPELESS: NOT AT ALL

## 2024-12-03 ASSESSMENT — LIFESTYLE VARIABLES
HOW MANY STANDARD DRINKS CONTAINING ALCOHOL DO YOU HAVE ON A TYPICAL DAY: 1 OR 2
HOW OFTEN DO YOU HAVE A DRINK CONTAINING ALCOHOL: 2-4 TIMES A MONTH

## 2024-12-03 NOTE — PROGRESS NOTES
Medicare Annual Wellness Visit    Karen Ridley is here for Medicare AWV    Assessment & Plan     1. Medicare annual wellness visit, subsequent  She has an advanced directive, was asked to bring a copy.  Flu, and COVID recommended.  Prevnar administered today. Continue to follow up here every six months, at least, and with specialists as directed.     2. Colon cancer screening  She has a Cologuard at home, sent from her insurance company.  She will return this soon.    3. Encounter for screening mammogram for breast cancer  Again declines mammogram, after discussion of risk and benefits of the imaging.    4. Primary hypertension  Elevated blood pressure, again on repeat today.  She is a feeling her blood pressure has been elevated at home.  Will increase amlodipine to 10 mg and have her follow-up in 6 weeks.  - Lipid Panel; Future  - Comprehensive Metabolic Panel; Future  - CBC with Auto Differential; Future  - amLODIPine (NORVASC) 10 MG tablet; Take 1 tablet by mouth daily TAKE 1 TABLET BY MOUTH DAILY FOR BLOOD PRESSURE  Dispense: 90 tablet; Refill: 4    5. Age-related osteoporosis without current pathological fracture  Continues on alendronate weekly.  Bone density test should be repeated next fall.  Will assess vitamin D level with labs.  - alendronate (FOSAMAX) 70 MG tablet; Take 1 tablet by mouth every 7 days  Dispense: 12 tablet; Refill: 4    6. Other hyperlipidemia  Treated only with TLC.  Will obtain mostly fasting lipids today to assess cardiovascular risk.  - Lipid Panel; Future    7. Vitamin D deficiency  Currently off vitamin D supplement, with associated osteoporosis.  Will assess vitamin D levels with labs today.  - Vitamin D 25 Hydroxy; Future       Recommendations for Preventive Services Due: see orders and patient instructions/AVS.  Recommended screening schedule for the next 5-10 years is provided to the patient in written form: see Patient Instructions/AVS.       Follow up: 6 weeks for HTN

## 2024-12-03 NOTE — PATIENT INSTRUCTIONS
Please bring in a copy of your advanced medical directive/living will so we and the hospital will have it on file.     I recommend you have updated COVID and flu vaccines this fall, at the pharmacy.         A Healthy Heart: Care Instructions  Overview     Coronary artery disease, also called heart disease, occurs when a substance called plaque builds up in the vessels that supply oxygen-rich blood to your heart muscle. This can narrow the blood vessels and reduce blood flow. A heart attack happens when blood flow is completely blocked. A high-fat diet, smoking, and other factors increase the risk of heart disease.  Your doctor has found that you have a chance of having heart disease. A heart-healthy lifestyle can help keep your heart healthy and prevent heart disease. This lifestyle includes eating healthy, being active, staying at a weight that's healthy for you, and not smoking or using tobacco. It also includes taking medicines as directed, managing other health conditions, and trying to get a healthy amount of sleep.  Follow-up care is a key part of your treatment and safety. Be sure to make and go to all appointments, and call your doctor if you are having problems. It's also a good idea to know your test results and keep a list of the medicines you take.  How can you care for yourself at home?  Diet    Use less salt when you cook and eat. This helps lower your blood pressure. Taste food before salting. Add only a little salt when you think you need it. With time, your taste buds will adjust to less salt.     Eat fewer snack items, fast foods, canned soups, and other high-salt, high-fat, processed foods.     Read food labels and try to avoid saturated and trans fats. They increase your risk of heart disease by raising cholesterol levels.     Limit the amount of solid fat--butter, margarine, and shortening--you eat. Use olive, peanut, or canola oil when you cook. Bake, broil, and steam foods instead of frying

## 2024-12-04 LAB
25(OH)D3 SERPL-MCNC: 16.6 NG/ML (ref 30–100)
ALBUMIN SERPL-MCNC: 4.3 G/DL (ref 3.5–5)
ALBUMIN/GLOB SERPL: 1.3 (ref 1.1–2.2)
ALP SERPL-CCNC: 51 U/L (ref 45–117)
ALT SERPL-CCNC: 21 U/L (ref 12–78)
ANION GAP SERPL CALC-SCNC: 7 MMOL/L (ref 2–12)
AST SERPL-CCNC: 14 U/L (ref 15–37)
BASOPHILS # BLD: 0.1 K/UL (ref 0–0.1)
BASOPHILS NFR BLD: 1 % (ref 0–1)
BILIRUB SERPL-MCNC: 0.5 MG/DL (ref 0.2–1)
BUN SERPL-MCNC: 16 MG/DL (ref 6–20)
BUN/CREAT SERPL: 23 (ref 12–20)
CALCIUM SERPL-MCNC: 9.6 MG/DL (ref 8.5–10.1)
CHLORIDE SERPL-SCNC: 108 MMOL/L (ref 97–108)
CHOLEST SERPL-MCNC: 240 MG/DL
CO2 SERPL-SCNC: 26 MMOL/L (ref 21–32)
CREAT SERPL-MCNC: 0.71 MG/DL (ref 0.55–1.02)
DIFFERENTIAL METHOD BLD: NORMAL
EOSINOPHIL # BLD: 0.1 K/UL (ref 0–0.4)
EOSINOPHIL NFR BLD: 1 % (ref 0–7)
ERYTHROCYTE [DISTWIDTH] IN BLOOD BY AUTOMATED COUNT: 12.5 % (ref 11.5–14.5)
GLOBULIN SER CALC-MCNC: 3.2 G/DL (ref 2–4)
GLUCOSE SERPL-MCNC: 104 MG/DL (ref 65–100)
HCT VFR BLD AUTO: 39 % (ref 35–47)
HDLC SERPL-MCNC: 96 MG/DL
HDLC SERPL: 2.5 (ref 0–5)
HGB BLD-MCNC: 12.7 G/DL (ref 11.5–16)
IMM GRANULOCYTES # BLD AUTO: 0 K/UL (ref 0–0.04)
IMM GRANULOCYTES NFR BLD AUTO: 0 % (ref 0–0.5)
LDLC SERPL CALC-MCNC: 125.8 MG/DL (ref 0–100)
LYMPHOCYTES # BLD: 1.6 K/UL (ref 0.8–3.5)
LYMPHOCYTES NFR BLD: 35 % (ref 12–49)
MCH RBC QN AUTO: 28.6 PG (ref 26–34)
MCHC RBC AUTO-ENTMCNC: 32.6 G/DL (ref 30–36.5)
MCV RBC AUTO: 87.8 FL (ref 80–99)
MONOCYTES # BLD: 0.4 K/UL (ref 0–1)
MONOCYTES NFR BLD: 8 % (ref 5–13)
NEUTS SEG # BLD: 2.6 K/UL (ref 1.8–8)
NEUTS SEG NFR BLD: 55 % (ref 32–75)
NRBC # BLD: 0 K/UL (ref 0–0.01)
NRBC BLD-RTO: 0 PER 100 WBC
PLATELET # BLD AUTO: 226 K/UL (ref 150–400)
PMV BLD AUTO: 10.6 FL (ref 8.9–12.9)
POTASSIUM SERPL-SCNC: 4.7 MMOL/L (ref 3.5–5.1)
PROT SERPL-MCNC: 7.5 G/DL (ref 6.4–8.2)
RBC # BLD AUTO: 4.44 M/UL (ref 3.8–5.2)
SODIUM SERPL-SCNC: 141 MMOL/L (ref 136–145)
TRIGL SERPL-MCNC: 91 MG/DL
VLDLC SERPL CALC-MCNC: 18.2 MG/DL
WBC # BLD AUTO: 4.8 K/UL (ref 3.6–11)

## 2025-01-14 ENCOUNTER — TELEPHONE (OUTPATIENT)
Facility: CLINIC | Age: 70
End: 2025-01-14

## 2025-01-14 ENCOUNTER — OFFICE VISIT (OUTPATIENT)
Facility: CLINIC | Age: 70
End: 2025-01-14
Payer: MEDICARE

## 2025-01-14 VITALS
OXYGEN SATURATION: 98 % | DIASTOLIC BLOOD PRESSURE: 70 MMHG | BODY MASS INDEX: 21.26 KG/M2 | RESPIRATION RATE: 16 BRPM | HEIGHT: 63 IN | HEART RATE: 62 BPM | WEIGHT: 120 LBS | SYSTOLIC BLOOD PRESSURE: 130 MMHG

## 2025-01-14 DIAGNOSIS — J41.0 SIMPLE CHRONIC BRONCHITIS (HCC): ICD-10-CM

## 2025-01-14 DIAGNOSIS — I10 PRIMARY HYPERTENSION: Primary | ICD-10-CM

## 2025-01-14 DIAGNOSIS — M34.9 SYSTEMIC SCLEROSIS WITH LIMITED CUTANEOUS INVOLVEMENT (HCC): ICD-10-CM

## 2025-01-14 PROCEDURE — 1126F AMNT PAIN NOTED NONE PRSNT: CPT | Performed by: INTERNAL MEDICINE

## 2025-01-14 PROCEDURE — 1160F RVW MEDS BY RX/DR IN RCRD: CPT | Performed by: INTERNAL MEDICINE

## 2025-01-14 PROCEDURE — 99214 OFFICE O/P EST MOD 30 MIN: CPT | Performed by: INTERNAL MEDICINE

## 2025-01-14 PROCEDURE — 3078F DIAST BP <80 MM HG: CPT | Performed by: INTERNAL MEDICINE

## 2025-01-14 PROCEDURE — 1123F ACP DISCUSS/DSCN MKR DOCD: CPT | Performed by: INTERNAL MEDICINE

## 2025-01-14 PROCEDURE — 1159F MED LIST DOCD IN RCRD: CPT | Performed by: INTERNAL MEDICINE

## 2025-01-14 PROCEDURE — 3075F SYST BP GE 130 - 139MM HG: CPT | Performed by: INTERNAL MEDICINE

## 2025-01-14 RX ORDER — FLUTICASONE PROPIONATE AND SALMETEROL 250; 50 UG/1; UG/1
1 POWDER RESPIRATORY (INHALATION) EVERY 12 HOURS
Qty: 60 EACH | Refills: 0 | Status: SHIPPED | OUTPATIENT
Start: 2025-01-14

## 2025-01-14 RX ORDER — BUDESONIDE AND FORMOTEROL FUMARATE DIHYDRATE 160; 4.5 UG/1; UG/1
2 AEROSOL RESPIRATORY (INHALATION) 2 TIMES DAILY
Qty: 10.2 G | Refills: 1 | Status: SHIPPED | OUTPATIENT
Start: 2025-01-14

## 2025-01-14 SDOH — ECONOMIC STABILITY: FOOD INSECURITY: WITHIN THE PAST 12 MONTHS, THE FOOD YOU BOUGHT JUST DIDN'T LAST AND YOU DIDN'T HAVE MONEY TO GET MORE.: NEVER TRUE

## 2025-01-14 SDOH — ECONOMIC STABILITY: FOOD INSECURITY: WITHIN THE PAST 12 MONTHS, YOU WORRIED THAT YOUR FOOD WOULD RUN OUT BEFORE YOU GOT MONEY TO BUY MORE.: NEVER TRUE

## 2025-01-14 ASSESSMENT — PATIENT HEALTH QUESTIONNAIRE - PHQ9
SUM OF ALL RESPONSES TO PHQ9 QUESTIONS 1 & 2: 0
2. FEELING DOWN, DEPRESSED OR HOPELESS: NOT AT ALL
SUM OF ALL RESPONSES TO PHQ QUESTIONS 1-9: 0
1. LITTLE INTEREST OR PLEASURE IN DOING THINGS: NOT AT ALL
SUM OF ALL RESPONSES TO PHQ QUESTIONS 1-9: 0

## 2025-01-14 NOTE — TELEPHONE ENCOUNTER
Patient calling to speak with nurse regarding inhaler that was called in for her today - states this was over 200 and would like to know if she can have something else instead that her insurance would cover   Patient also stated that she would take an antibiotic instead if necessary

## 2025-01-14 NOTE — PROGRESS NOTES
speaking. Over-the-counter Aleve has not been effective in managing her cough. Despite her cough, she is able to carry out her usual activities, although she experiences fatigue in the afternoons due to excessive coughing. The cough is not productive, but she occasionally experiences mild congestion. She has not used an inhaler for her bronchitis, but uses a humidifier at home. She also reports mild chest tightness.               Current Outpatient Medications:     vitamin D (ERGOCALCIFEROL) 1.25 MG (67781 UT) CAPS capsule, Take 1 capsule by mouth once a week, Disp: 12 capsule, Rfl: 0    diclofenac (VOLTAREN) 75 MG EC tablet, Take 1 tablet by mouth as needed, Disp: , Rfl:     amLODIPine (NORVASC) 10 MG tablet, Take 1 tablet by mouth daily TAKE 1 TABLET BY MOUTH DAILY FOR BLOOD PRESSURE, Disp: 90 tablet, Rfl: 4    alendronate (FOSAMAX) 70 MG tablet, Take 1 tablet by mouth every 7 days, Disp: 12 tablet, Rfl: 4    No Known Allergies     BP (!) 144/80 (Site: Left Upper Arm, Position: Sitting, Cuff Size: Small Adult)   Pulse 62   Resp 16   Ht 1.6 m (5' 3\")   Wt 54.4 kg (120 lb)   SpO2 98%   BMI 21.26 kg/m²      Repeat blood pressure:  130/70    General: well nourished, well appearing, in no distress  Heart: regular, normal rate, no murmurs noted, distal pulses 2+ bilaterally, no pitting peripheral edema  Lungs: good air movement, clear to auscultation bilaterally, no wheezing or rales noted       Lab Results   Component Value Date     12/03/2024    K 4.7 12/03/2024     12/03/2024    CO2 26 12/03/2024    BUN 16 12/03/2024    CREATININE 0.71 12/03/2024    GLUCOSE 104 (H) 12/03/2024    CALCIUM 9.6 12/03/2024    BILITOT 0.5 12/03/2024    ALKPHOS 51 12/03/2024    AST 14 (L) 12/03/2024    ALT 21 12/03/2024    LABGLOM >90 12/03/2024    GFRAA 103 12/23/2021    AGRATIO 1.2 04/11/2023    GLOB 3.2 12/03/2024            This note was created with the help of speech recognition software (Dragon) and may contain some

## 2025-01-14 NOTE — TELEPHONE ENCOUNTER
Noted. I am sending in Wixela--an alternative inhaler. Let's see if this is less expensive. An antibiotic is not indicated.

## 2025-02-01 DIAGNOSIS — E55.9 VITAMIN D DEFICIENCY: ICD-10-CM

## 2025-02-03 RX ORDER — ERGOCALCIFEROL 1.25 MG/1
50000 CAPSULE, LIQUID FILLED ORAL WEEKLY
Qty: 12 CAPSULE | Refills: 3 | OUTPATIENT
Start: 2025-02-03

## 2025-02-03 NOTE — TELEPHONE ENCOUNTER
Per [employer] message in dec 2024, pt was to take [id number] vitamin D for 3 months for once a week then start taking vitamin D (D3) 2000 units once daily over-the-counter. Request for [id number] cancelled

## 2025-02-12 RX ORDER — FLUTICASONE PROPIONATE AND SALMETEROL 250; 50 UG/1; UG/1
POWDER RESPIRATORY (INHALATION)
Qty: 60 EACH | Refills: 0 | Status: SHIPPED | OUTPATIENT
Start: 2025-02-12

## 2025-02-12 NOTE — TELEPHONE ENCOUNTER
PCP: Liliana Sorto MD    Last Appointment: 1/14/2025    Future Appointments   Date Time Provider Department Center   7/15/2025  9:45 AM Liliana Sorto MD [facility] DEP       Requested Prescriptions     Pending Prescriptions Disp Refills    WIXELA INHUB 250-50 MCG/ACT AEPB diskus inhaler [Pharmacy Med Name: WIXELA 250-50 INHUB] 60 each 0     Sig: INHALE 1 PUFF INTO THE LUNGS IN THE MORNING AND IN THE EVENING       LAST ORDERED: 1/14/25      Morena \"Tyrell\" LUCÍA Dorsey

## 2025-07-15 ENCOUNTER — OFFICE VISIT (OUTPATIENT)
Facility: CLINIC | Age: 70
End: 2025-07-15
Payer: MEDICARE

## 2025-07-15 VITALS
HEART RATE: 66 BPM | DIASTOLIC BLOOD PRESSURE: 74 MMHG | WEIGHT: 117.8 LBS | HEIGHT: 63 IN | OXYGEN SATURATION: 98 % | BODY MASS INDEX: 20.87 KG/M2 | SYSTOLIC BLOOD PRESSURE: 138 MMHG

## 2025-07-15 DIAGNOSIS — E78.49 OTHER HYPERLIPIDEMIA: ICD-10-CM

## 2025-07-15 DIAGNOSIS — E55.9 VITAMIN D DEFICIENCY, UNSPECIFIED: ICD-10-CM

## 2025-07-15 DIAGNOSIS — I10 PRIMARY HYPERTENSION: Primary | ICD-10-CM

## 2025-07-15 DIAGNOSIS — E55.9 VITAMIN D DEFICIENCY: ICD-10-CM

## 2025-07-15 DIAGNOSIS — I10 PRIMARY HYPERTENSION: ICD-10-CM

## 2025-07-15 PROCEDURE — 1123F ACP DISCUSS/DSCN MKR DOCD: CPT | Performed by: INTERNAL MEDICINE

## 2025-07-15 PROCEDURE — 3075F SYST BP GE 130 - 139MM HG: CPT | Performed by: INTERNAL MEDICINE

## 2025-07-15 PROCEDURE — 1126F AMNT PAIN NOTED NONE PRSNT: CPT | Performed by: INTERNAL MEDICINE

## 2025-07-15 PROCEDURE — 99214 OFFICE O/P EST MOD 30 MIN: CPT | Performed by: INTERNAL MEDICINE

## 2025-07-15 PROCEDURE — 3078F DIAST BP <80 MM HG: CPT | Performed by: INTERNAL MEDICINE

## 2025-07-15 PROCEDURE — 1159F MED LIST DOCD IN RCRD: CPT | Performed by: INTERNAL MEDICINE

## 2025-07-15 PROCEDURE — 1160F RVW MEDS BY RX/DR IN RCRD: CPT | Performed by: INTERNAL MEDICINE

## 2025-07-15 NOTE — PROGRESS NOTES
A/P:          ICD-10-CM    1. Primary hypertension  I10 Vitamin D 25 Hydroxy      2. Vitamin D deficiency  E55.9 Basic Metabolic Panel      3. Vitamin D deficiency, unspecified  E55.9 Vitamin D 25 Hydroxy      4. Other hyperlipidemia  E78.49            Assessment & Plan  1. Hypertension: Stable.  - Blood pressure readings have been satisfactory, with occasional elevations at home, on amlodipine.  - Continue monitoring blood pressure at home but only as needed for symptoms, as her machine is reading high.  - Continue current regimen of amlodipine.    2. Hypercholesterolemia: Elevated risk for cardiovascular events due to age and hypertension--11.4% by last labs.   - Prefers to avoid additional medication.  - Encouraged her to follow a mostly plant-based diet and avoid processed foods.  - Cholesterol levels will be reassessed at the next visit.    3. Vitamin D deficiency:   - Currently taking a weekly prescription of vitamin D.  - Vitamin D levels will be checked today, along with calcium and kidney function tests.               Follow up: 6 months AWV        An electronic signature was used to authenticate this note.    --Liliana Sorto MD         HPI: Karen Ridley is here for a follow up visit:      History of Present Illness  The patient is a 70-year-old female here for follow-up on blood pressure and cholesterol.    She has been monitoring her blood pressure at home, which has generally been within normal limits, although she has experienced a few instances of elevated readings. She typically checks her blood pressure in the morning after taking her amlodipine medication and occasionally in the evening. She reports no chest discomfort during physical exertion, breathing difficulties, or dizziness. She brought her blood pressure cuff to the clinic today.    She is not currently on any medication for cholesterol management. Her last blood test in December 2024 indicated borderline cholesterol levels. She

## 2025-07-16 LAB
25(OH)D3 SERPL-MCNC: 51.2 NG/ML (ref 30–100)
ANION GAP SERPL CALC-SCNC: 6 MMOL/L (ref 2–12)
BUN SERPL-MCNC: 16 MG/DL (ref 6–20)
BUN/CREAT SERPL: 20 (ref 12–20)
CALCIUM SERPL-MCNC: 9.4 MG/DL (ref 8.5–10.1)
CHLORIDE SERPL-SCNC: 104 MMOL/L (ref 97–108)
CO2 SERPL-SCNC: 27 MMOL/L (ref 21–32)
CREAT SERPL-MCNC: 0.82 MG/DL (ref 0.55–1.02)
GLUCOSE SERPL-MCNC: 99 MG/DL (ref 65–100)
POTASSIUM SERPL-SCNC: 4.1 MMOL/L (ref 3.5–5.1)
SODIUM SERPL-SCNC: 137 MMOL/L (ref 136–145)